# Patient Record
(demographics unavailable — no encounter records)

---

## 2025-02-26 NOTE — HISTORY OF PRESENT ILLNESS
[Parents] : parents [Breast milk] : breast milk [Formula ___ oz/feed] : [unfilled] oz of formula per feed [Hours between feeds ___] : Child is fed every [unfilled] hours [Vitamins ___] : Patient takes [unfilled] vitamins daily [Normal] : Normal [___ voids per day] : [unfilled] voids per day [Frequency of stools: ___] : Frequency of stools: [unfilled]  stools [Yellow] : yellow [Pasty] : pasty [In Bassinet/Crib] : sleeps in bassinet/crib [On back] : sleeps on back [Pacifier use] : Pacifier use [No] : No cigarette smoke exposure [Water heater temperature set at <120 degrees F] : Water heater temperature set at <120 degrees F [Rear facing car seat in back seat] : Rear facing car seat in back seat [Carbon Monoxide Detectors] : Carbon monoxide detectors at home [Smoke Detectors] : Smoke detectors at home. [NO] : No [Co-sleeping] : no co-sleeping [Loose bedding, pillow, toys, and/or bumpers in crib] : no loose bedding, pillow, toys, and/or bumpers in crib [Exposure to electronic nicotine delivery system] : No exposure to electronic nicotine delivery system [At risk for exposure to TB] : Not at risk for exposure to Tuberculosis

## 2025-02-26 NOTE — DISCUSSION/SUMMARY
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 Has follow ups with  service. he is being fed with expressed breast milk and sharan sure formula.  he is being followed in endocrine as this syndrome may be associated with hypoglycemia and poor growth. 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 he has been referred to ENT for this  In addition , he has a right undescended testicle. he has been referred to Urology for this and parents want a circumcision.

## 2025-02-26 NOTE — DEVELOPMENTAL MILESTONES
[Calms when picked up or spoken to] : calms when picked up or spoken to [Looks briefly at objects] : looks briefly at objects

## 2025-02-26 NOTE — PHYSICAL EXAM
[Alert] : alert [Normocephalic] : normocephalic [Flat Open Anterior [place]] : flat open anterior fontanelle [PERRL] : PERRL [Red Reflex Bilateral] : red reflex bilateral [Normally Placed Ears] : normally placed ears [Auricles Well Formed] : auricles well formed [Clear Tympanic membranes] : clear tympanic membranes [Light reflex present] : light reflex present [Bony landmarks visible] : bony landmarks visible [Nares Patent] : nares patent [Palate Intact] : palate intact [Uvula Midline] : uvula midline [Supple, full passive range of motion] : supple, full passive range of motion [Symmetric Chest Rise] : symmetric chest rise [Clear to Auscultation Bilaterally] : clear to auscultation bilaterally [Regular Rate and Rhythm] : regular rate and rhythm [S1, S2 present] : S1, S2 present [+2 Femoral Pulses] : +2 femoral pulses [Soft] : soft [Bowel Sounds] : bowel sounds present [Central Urethral Opening] : central urethral opening [Normally Placed] : normally placed [No Abnormal Lymph Nodes Palpated] : no abnormal lymph nodes palpated [Symmetric Flexed Extremities] : symmetric flexed extremities [Startle Reflex] : startle reflex present [Suck Reflex] : suck reflex present [Rooting] : rooting reflex present [Palmar Grasp] : palmar grasp reflex present [Plantar Grasp] : plantar grasp reflex present [Symmetric [place]] : symmetric [place] [Dermal Melanocytosis] : Dermal Melanocytosis [Acute Distress] : no acute distress [Discharge] : no discharge [Palpable Masses] : no palpable masses [Murmurs] : no murmurs [Tender] : nontender [Distended] : not distended [Hepatomegaly] : no hepatomegaly [Splenomegaly] : no splenomegaly [Normal external genitailia] : no normal external genitalia [Circumcised] : not circumcised [Testicles Descended Bilaterally] : testicle(s) undescended [Castellon-Ortolani] : negative Castellon-Ortolani [Spinal Dimple] : no spinal dimple [Tuft of Hair] : no tuft of hair [Jaundice] : no jaundice [Rash and/or lesion present] : no rash/lesion [de-identified] : tongue tie [FreeTextEntry6] : right undescended testilcle

## 2025-03-17 NOTE — CONSULT LETTER
[Dear  ___] : Dear  [unfilled], [Consult Letter:] : I had the pleasure of evaluating your patient, [unfilled]. [Please see my note below.] : Please see my note below. [Consult Closing:] : Thank you very much for allowing me to participate in the care of this patient.  If you have any questions, please do not hesitate to contact me. [Sincerely,] : Sincerely, [FreeTextEntry3] : Deena Kenney MD [facility] Physician [facility], later [facility] Division of Pediatric Endocrinology

## 2025-03-17 NOTE — PHYSICAL EXAM
[Healthy Appearing] : healthy appearing [Well Nourished] : well nourished [Interactive] : interactive [Normal Appearance] : normal appearance [Well formed] : well formed [Normally Set] : normally set [Normal S1 and S2] : normal S1 and S2 [Murmur] : no murmurs [Clear to Ausculation Bilaterally] : clear to auscultation bilaterally [Abdomen Soft] : soft [Abdomen Tenderness] : non-tender [] : no hepatosplenomegaly [Normal] : normal  [de-identified] : L testicle palpable in scrotal sac, R testicle palpable  in upper inguinal canal

## 2025-03-17 NOTE — PAST MEDICAL HISTORY
[At ___ Weeks Gestation] : at [unfilled] weeks gestation [ Section] : by  section [Non-reassuring Fetal Status] : non-reassuring fetal status [FreeTextEntry1] : 1170g [FreeTextEntry4] : pre-eclampsia

## 2025-03-17 NOTE — HISTORY OF PRESENT ILLNESS
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  Since NICU discharge, he has been well. He feeds pumped breast milk and formula together. He takes 45-50 ml at a time every 2-3 hours. He gets cranky when due for a feeding, he does not get shaky or sweaty. He makes multiple wet diapers per day. He goes 4 days without stooling. When he does stool it they are usually normal. Last week he did not stool for 5 days and stools were hard and pebble like.    will see ENT for tongue tie, urology for undescended testicle, NICU clinic, genetics for RSS. He also needs an appointment with ophthalmology..

## 2025-03-17 NOTE — REASON FOR VISIT
[Consultation] : a consultation visit [Patient] : patient [Parents] : parents [Medical Records] : medical records [Language Line ] : provided by Language Line   [Time Spent: ____ minutes] : Total time spent using  services: [unfilled] minutes. The patient's primary language is not English thus required  services. [Interpreters_IDNumber] : [id number] [Interpreters_FullName] : Gabe [TWNoteComboBox1] : Pakistani

## 2025-03-17 NOTE — REASON FOR VISIT
[Consultation] : a consultation visit [Patient] : patient [Parents] : parents [Medical Records] : medical records [Language Line ] : provided by Language Line   [Time Spent: ____ minutes] : Total time spent using  services: [unfilled] minutes. The patient's primary language is not English thus required  services. [Interpreters_IDNumber] : [id number] [Interpreters_FullName] : Gabe [TWNoteComboBox1] : Albanian

## 2025-03-17 NOTE — CONSULT LETTER
[Dear  ___] : Dear  [unfilled], [Consult Letter:] : I had the pleasure of evaluating your patient, [unfilled]. [Please see my note below.] : Please see my note below. [Consult Closing:] : Thank you very much for allowing me to participate in the care of this patient.  If you have any questions, please do not hesitate to contact me. [Sincerely,] : Sincerely, [FreeTextEntry3] : Deena Kenney MD [facility] Physician [facility] Division of Pediatric Endocrinology

## 2025-03-17 NOTE — PHYSICAL EXAM
[Healthy Appearing] : healthy appearing [Well Nourished] : well nourished [Interactive] : interactive [Normal Appearance] : normal appearance [Well formed] : well formed [Normally Set] : normally set [Normal S1 and S2] : normal S1 and S2 [Murmur] : no murmurs [Clear to Ausculation Bilaterally] : clear to auscultation bilaterally [Abdomen Soft] : soft [Abdomen Tenderness] : non-tender [] : no hepatosplenomegaly [Normal] : normal  [de-identified] : L testicle palpable in scrotal sac, R testicle palpable  in upper inguinal canal

## 2025-03-17 NOTE — END OF VISIT
Lotrisone cream apply twice daily to the affected area   [Time Spent: ___ minutes] : I have spent [unfilled] minutes of time on the encounter which excludes teaching and separately reported services.

## 2025-03-19 NOTE — PHYSICAL EXAM
[Alert] : alert [Normocephalic] : normocephalic [Flat Open Anterior [place]] : flat open anterior fontanelle [PERRL] : PERRL [Red Reflex Bilateral] : red reflex bilateral [Normally Placed Ears] : normally placed ears [Auricles Well Formed] : auricles well formed [Clear Tympanic membranes] : clear tympanic membranes [Light reflex present] : light reflex present [Bony landmarks visible] : bony landmarks visible [Nares Patent] : nares patent [Palate Intact] : palate intact [Uvula Midline] : uvula midline [Supple, full passive range of motion] : supple, full passive range of motion [Symmetric Chest Rise] : symmetric chest rise [Clear to Auscultation Bilaterally] : clear to auscultation bilaterally [Regular Rate and Rhythm] : regular rate and rhythm [S1, S2 present] : S1, S2 present [+2 Femoral Pulses] : +2 femoral pulses [Soft] : soft [Bowel Sounds] : bowel sounds present [Normal external genitailia] : normal external genitalia [Central Urethral Opening] : central urethral opening [Normally Placed] : normally placed [No Abnormal Lymph Nodes Palpated] : no abnormal lymph nodes palpated [Symmetric Flexed Extremities] : symmetric flexed extremities [Startle Reflex] : startle reflex present [Suck Reflex] : suck reflex present [Rooting] : rooting reflex present [Palmar Grasp] : palmar grasp reflex present [Plantar Grasp] : plantar grasp reflex present [Symmetric Shaka] : symmetric [place] [Acute Distress] : no acute distress [Discharge] : no discharge [Erythematous Oropharynx] : no erythematous oropharynx [Palpable Masses] : no palpable masses [Murmurs] : no murmurs [Tender] : nontender [Distended] : not distended [Hepatomegaly] : no hepatomegaly [Splenomegaly] : no splenomegaly [Circumcised] : not circumcised [Testicles Descended Bilaterally] : testicle(s) undescended [Castellon-Ortolani] : negative Castellon-Ortolani [Spinal Dimple] : no spinal dimple [Tuft of Hair] : no tuft of hair [Rash and/or lesion present] : no rash/lesion [de-identified] : tongue -tie [FreeTextEntry6] : right undescended testicle

## 2025-03-19 NOTE — HISTORY OF PRESENT ILLNESS
[Parents] : parents [Formula ___ oz/feed] : [unfilled] oz of formula per feed [Hours between feeds ___] : Child is fed every [unfilled] hours [Vitamins ___] : Patient takes [unfilled] vitamins daily [Normal] : Normal [___ voids per day] : [unfilled] voids per day [Frequency of stools: ___] : Frequency of stools: [unfilled]  stools [every ___ day(s)] : every [unfilled] day(s). [In Bassinet/Crib] : sleeps in bassinet/crib [On back] : sleeps on back [Pacifier use] : Pacifier use [No] : No cigarette smoke exposure [Water heater temperature set at <120 degrees F] : Water heater temperature set at <120 degrees F [Rear facing car seat in back seat] : Rear facing car seat in back seat [Carbon Monoxide Detectors] : Carbon monoxide detectors at home [Smoke Detectors] : Smoke detectors at home. [NO] : No [Co-sleeping] : no co-sleeping [Loose bedding, pillow, toys, and/or bumpers in crib] : no loose bedding, pillow, toys, and/or bumpers in crib [Exposure to electronic nicotine delivery system] : No exposure to electronic nicotine delivery system [At risk for exposure to TB] : Not at risk for exposure to Tuberculosis

## 2025-03-19 NOTE — DEVELOPMENTAL MILESTONES
[None] : none [Smiles responsively] : smiles responsively [Vocalizes with simple cooing] : vocalizes with simple cooing [Opens and shuts hands] : opens and shuts hands [Passed] : passed [Lifts head and chest in prone] : does not lift head and chest in prone

## 2025-03-19 NOTE — DISCUSSION/SUMMARY
[No Elimination Concerns] : elimination [Continue Regimen] : feeding [No Skin Concerns] : skin [Normal Sleep Pattern] : sleep [ Infant] :  infant [Poor Weight Gain] : poor weight gain [Poor Length Gain] : poor length gain [None] : no medical problems [Anticipatory Guidance Given] : Anticipatory guidance addressed as per the history of present illness section [Age Approp Vaccines] : Age appropriate vaccines administered [Parent/Guardian] : Parent/Guardian [] : The components of the vaccine(s) to be administered today are listed in the plan of care. The disease(s) for which the vaccine(s) are intended to prevent and the risks have been discussed with the caretaker.  The risks are also included in the appropriate vaccination information statements which have been provided to the patient's caregiver.  The caregiver has given consent to vaccinate. [Delayed-Normal For Gest Age] : delayed but normal for patient's gestational age [No Medication Changes] : No medication changes at this time [de-identified] : Urology/ GI [FreeTextEntry1] : Recommend exclusive breastfeeding, 8-12 feedings per day. Mother should continue prenatal vitamins and avoid alcohol. If formula is needed, recommend iron-fortified formulations, 2-4 oz every 3-4 hrs. When in car, patient should be in rear-facing car seat in back seat. Put baby to sleep on back, in own crib with no loose or soft bedding. Help baby to maintain sleep and feeding routines. May offer pacifier if needed. Continue tummy time when awake. Parents counseled to call if rectal temperature >100.4 degrees F.

## 2025-03-21 NOTE — BIRTH HISTORY
[At ___ Weeks Gestation] : at [unfilled] weeks gestation [ Section] : by  section [Passed] : passed [de-identified] : 33 [de-identified] : NICUx 6 weeks, intubated & NGT

## 2025-03-21 NOTE — HISTORY OF PRESENT ILLNESS
[de-identified] : 3-21-25 2mo M here for initial evaluation of tongue tie sent by PMD for evaluation  takes EBM and/or formula 50ml q3hr  struggles to latch to bottle nipple, occasionally spills out of the sides mom unsure if the tongue tie is impacting his ability to eat gaining weight per mom  +snoring no pausing, choking, or gasping  some noisy breathing passed [facility] no family history of anesthesia complications or bleeding disorder  not followed by any other specialists  Nasim Silver syndrome some noisy breathing. no apneas or pauses

## 2025-03-21 NOTE — ASSESSMENT
[FreeTextEntry1] : 2 month male with tongue tie and noisy breathing.  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  Tongue tie noted. Discussed options of observation vs tongue tie release. Parents elected for tongue tie release and patient did well afterwards.   Discussed that tongue tie rarely causes any speech issues and if they do have speech issues it is pronunciation/articulation and not the number of words they develop and this can be revisited later in life if it becomes and issue.    Discussed that feeding difficulties can be multifactorial and if continues to have issues would recommend evaluation by feeding therapy.    Discussed at length with parent in regards to the natural history of laryngomalacia.  Discussed that the noise often worsens initially and peaks around 3-4 months of age and then usually subsides.  Depending on severity we sometimes need to intervene. In severe cases these patients often do not leave the hospital due to severity or at home they have issues with feeding, sleeping, and failure to thrive.  In theses cases we usually discuss surgical intervention. In mild cases, we often observe and no intervention or medications required.  In moderate cases, some of these patients progress on to needing intervention and some resolve.  Occasionally we use anti-reflux medicine in the moderate and severe cases, but I tend to try to avoid medications at this age and most kids grow out of the reflux as well.   I spent a total of 45 minutes on the encounter excluding separately billable services including but not limited to direct patient care, review and independent interpretation of prior records and testing, documentation, counseling patient/family, and coordination of care.

## 2025-03-21 NOTE — PHYSICAL EXAM
[Normal Gait and Station] : normal gait and station [Normal muscle strength, symmetry and tone of facial, head and neck musculature] : normal muscle strength, symmetry and tone of facial, head and neck musculature [Normal] : no cervical lymphadenopathy [Exposed Vessel] : left anterior vessel not exposed [Wheezing] : no wheezing [Increased Work of Breathing] : no increased work of breathing with use of accessory muscles and retractions [de-identified] : syndromic facies.  [de-identified] : tongue tie

## 2025-03-26 NOTE — DISCUSSION/SUMMARY
[GA at Birth: ___] : GA at Birth: [unfilled] [Chronological Age: ___] : Chronological Age: [unfilled] [Corrected Age: ___] : Corrected Age: [unfilled] [Alert] : alert [] : axial tone normal [Turns head to both sides (0-2 months)] : turns head to both sides (0-2 months) [Moves extremities equally] : moves extremities equally [Moves against gravity] : moves against gravity [Turns head side to side] : turns head side to side [Lifts head (45 deg 0-2 mon, 90 deg 1-3 mon)] : lifts head (45 degrees 0-2 months, 90 degrees 1-3 months) [Active] : sidelying to supine (1.5 - 2 months) - Active [Passive] : prone to supine (2- 5 months) - Passive [Lag] : Head lag (0-2 months) - lag [Poor] : head control is poor [Gross Grasp] : gross grasp [Release] : release [>] : > [Focusing (2 months)] : focusing (2 months) [Tracking (2 months)] : tracking (2 months) [Supine] : supine [Prone] : prone [Sidelying] : sidelying [FreeTextEntry1] : poonam silver syndrome, Trisomy 7 [FreeTextEntry5] : right plagiocephaly [FreeTextEntry6] : mildly decreased muscle tone in bilateral LE & bilateral UE [FreeTextEntry3] : Infant tolerated PT session well. Parents educated in developmental positioning packet level I in [place]anian. Parents educated in strategies to improve head shaping and cervical ROM activities demonstrated. Parents with good understanding of all education.  Keeley #[id number] present for entire session.

## 2025-03-26 NOTE — DISCUSSION/SUMMARY
[GA at Birth: ___] : GA at Birth: [unfilled] [Chronological Age: ___] : Chronological Age: [unfilled] [Corrected Age: ___] : Corrected Age: [unfilled] [Alert] : alert [] : axial tone normal [Turns head to both sides (0-2 months)] : turns head to both sides (0-2 months) [Moves extremities equally] : moves extremities equally [Moves against gravity] : moves against gravity [Turns head side to side] : turns head side to side [Lifts head (45 deg 0-2 mon, 90 deg 1-3 mon)] : lifts head (45 degrees 0-2 months, 90 degrees 1-3 months) [Active] : sidelying to supine (1.5 - 2 months) - Active [Passive] : prone to supine (2- 5 months) - Passive [Lag] : Head lag (0-2 months) - lag [Poor] : head control is poor [Gross Grasp] : gross grasp [Release] : release [>] : > [Focusing (2 months)] : focusing (2 months) [Tracking (2 months)] : tracking (2 months) [Supine] : supine [Prone] : prone [Sidelying] : sidelying [FreeTextEntry1] : poonam silver syndrome, Trisomy 7 [FreeTextEntry5] : right plagiocephaly [FreeTextEntry6] : mildly decreased muscle tone in bilateral LE & bilateral UE [FreeTextEntry3] : Infant tolerated PT session well. Parents educated in developmental positioning packet level I in [place]uran. Parents educated in strategies to improve head shaping and cervical ROM activities demonstrated. Parents with good understanding of all education.  Keeley #[id number] present for entire session.

## 2025-03-26 NOTE — DISCUSSION/SUMMARY
[GA at Birth: ___] : GA at Birth: [unfilled] [Chronological Age: ___] : Chronological Age: [unfilled] [Corrected Age: ___] : Corrected Age: [unfilled] [Alert] : alert [] : axial tone normal [Turns head to both sides (0-2 months)] : turns head to both sides (0-2 months) [Moves extremities equally] : moves extremities equally [Moves against gravity] : moves against gravity [Turns head side to side] : turns head side to side [Lifts head (45 deg 0-2 mon, 90 deg 1-3 mon)] : lifts head (45 degrees 0-2 months, 90 degrees 1-3 months) [Active] : sidelying to supine (1.5 - 2 months) - Active [Passive] : prone to supine (2- 5 months) - Passive [Lag] : Head lag (0-2 months) - lag [Poor] : head control is poor [Gross Grasp] : gross grasp [Release] : release [>] : > [Focusing (2 months)] : focusing (2 months) [Tracking (2 months)] : tracking (2 months) [Supine] : supine [Prone] : prone [Sidelying] : sidelying [FreeTextEntry1] : poonam silver syndrome, Trisomy 7 [FreeTextEntry5] : right plagiocephaly [FreeTextEntry6] : mildly decreased muscle tone in bilateral LE & bilateral UE [FreeTextEntry3] : Infant tolerated PT session well. Parents educated in developmental positioning packet level I in Montenegrin. Parents educated in strategies to improve head shaping and cervical ROM activities demonstrated. Parents with good understanding of all education.  Keeley #[id number] present for entire session.

## 2025-03-26 NOTE — DISCUSSION/SUMMARY
[GA at Birth: ___] : GA at Birth: [unfilled] [Chronological Age: ___] : Chronological Age: [unfilled] [Corrected Age: ___] : Corrected Age: [unfilled] [Alert] : alert [] : axial tone normal [Turns head to both sides (0-2 months)] : turns head to both sides (0-2 months) [Moves extremities equally] : moves extremities equally [Moves against gravity] : moves against gravity [Turns head side to side] : turns head side to side [Lifts head (45 deg 0-2 mon, 90 deg 1-3 mon)] : lifts head (45 degrees 0-2 months, 90 degrees 1-3 months) [Active] : sidelying to supine (1.5 - 2 months) - Active [Passive] : prone to supine (2- 5 months) - Passive [Lag] : Head lag (0-2 months) - lag [Poor] : head control is poor [Gross Grasp] : gross grasp [Release] : release [>] : > [Focusing (2 months)] : focusing (2 months) [Tracking (2 months)] : tracking (2 months) [Supine] : supine [Prone] : prone [Sidelying] : sidelying [FreeTextEntry1] : poonam silver syndrome, Trisomy 7 [FreeTextEntry5] : right plagiocephaly [FreeTextEntry6] : mildly decreased muscle tone in bilateral LE & bilateral UE [FreeTextEntry3] : Infant tolerated PT session well. Parents educated in developmental positioning packet level I in Indonesian. Parents educated in strategies to improve head shaping and cervical ROM activities demonstrated. Parents with good understanding of all education.  Keeley #[id number] present for entire session.

## 2025-03-27 NOTE — HISTORY OF PRESENT ILLNESS
[EDC: ___] : EDC: [unfilled] [Gestational Age: ___] : Gestational Age: [unfilled] [Chronological Age: ___] : Chronological Age: [unfilled] [Corrected Age: ___] : Corrected Age: [unfilled] [Developmental Pediatrics: ___] : Developmental Pediatrics: [unfilled] [Pediatric Surgery: ___] : Pediatric Surgery: [unfilled] [Ophthalmology: ___] : Ophthalmology: [unfilled] [Car seat use according to directions] : car seat used according to directions [Weight Gain Since Last Visit (oz/days) ___] : weight gain since last visit: [unfilled] (oz/days)  [___Formula] : [unfilled] [Every ___ hours] : every [unfilled] hours [_____ Times Per] : Stool frequency occurs [unfilled] times per  [Large amount] : large [Soft] : soft [Solid Foods] : no solid food at this time [Bloody] : not bloody [Mucousy] : no mucous [de-identified] : CYRIL BUSTAMANTE [de-identified] :  High risk  & Developmental follow up NRE 8 EI referral made on DC [de-identified] : none [de-identified] : ENT - no further f/u   URO  4/15/25   GENETICS  4/3    ENDO   9/25/25 [de-identified] : done  [de-identified] : spits up with feed. Gassy.  Poor feeding since the frenulectomy (3/21) [de-identified] : was feeding 45-40 ml per feed but now only taking 20-40 ml each feeding [FreeTextEntry4] : every 2-4 days [de-identified] : Placed supine in own bed.  Wakes or parents wake the baby to feed every 3 hrs [de-identified] : n/a

## 2025-03-27 NOTE — PHYSICAL EXAM
[Pink] : pink [Well Perfused] : well perfused [No Rashes] : no rashes [No Birth Marks] : no birth marks [Conjunctiva Clear] : conjunctiva clear [PERRL] : pupils were equal, round, reactive to light  [Ears Normal Position and Shape] : normal position and shape of ears [Nares Patent] : nares patent [No Nasal Flaring] : no nasal flaring [Moist and Pink Mucous Membranes] : moist and pink mucous membranes [Palate Intact] : palate intact [No Torticollis] : no torticollis [No Neck Masses] : no neck masses [Symmetric Expansion] : symmetric chest expansion [No Retractions] : no retractions [Clear to Auscultation] : lungs clear to auscultation  [Normal S1, S2] : normal S1 and S2 [Regular Rhythm] : regular rhythm [No Murmur] : no mumur [Normal Pulses] : normal pulses [Non Distended] : non distended [No HSM] : no hepatosplenomegaly appreciated [No Masses] : no masses were palpated [Normal Bowel Sounds] : normal bowel sounds [No Umbilical Hernia] : no umbilical hernia [No Sacral Dimples] : no sacral dimples [No Scoliosis] : no scoliosis [Normal Range of Motion] : normal range of motion [Normal Posture] : normal posture [No evidence of Hip Dislocation] : no evidence of hip dislocation [Active and Alert] : active and alert [Normal truncal tone] : normal truncal tone [Normal deep tendon reflexes] : normal deep tendon reflexes [No head lag] : no head lag [Symmetric Shaka] : the [place] reflex was ~L present [Fixes On Faces] : fixes on faces [Follows to Midline] : the gaze follows to the midline [Turns Head Side to Side in Prone] : turns head side to side in prone [Hands Open] : the hands open [de-identified] : (R) IGH, reduible and  (R) undescended testes [de-identified] : hypotonia x 4

## 2025-03-27 NOTE — ASSESSMENT
[FreeTextEntry1] : JUNIOR SANTIAGO is a 33 1/7 week gestation infant, now chronologic age 2mos 3 wks, corrected age 1 mo seen in  follow-up. Pertinent NICU history includes Prematurity 33 wks, Ansim Silver Syndrome, Respiratory failure due to RDS, Thrombocytopenia, Hyperbilirubinemia, Poor Growth.  The following issues were addressed at this visit.  Growth and nutrition: Weight gain has been 19 g/day and plots at the 0 percentile for corrected age.  Head growth and length are at the 0 and 0 percentiles respectively.  Baby is currently feeding 27 kobe Neosure and had been taking 45-50 ml per feeding every 3 hrs .  Since his frenectomy on 3/21, the baby's intake has decreased to 20-40 ml each feeding.  The volume per feeding has improved somewhat since 3/24 but still not adequate.  Parents instructed to take the baby to his PMD on 3/28 for weight check if he still is unable to take 45-50 ml consistently.   will see the baby in NICU Grad Clinic in 2 mos. to follow up feeding and weight gain. Due to prematurity, solid foods are not recommended until 5-6 months corrected age with good head control. No labs needed today. Continue vitamin supplements.  Development/neuro: baby has developmental delay for chronologic age, was seen by PT/OT today and given home exercises to do. 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 Early Intervention is recommended but parents have not been contacted.   SW will contact EI to facilitate scheduling the assessment.  Baby will follow-up with pediatric developmental - needs an August appointment.   Anemia: Baby has been on iron supplements but is now taking full formula feeds.  Discharge Hct 30.4.  Parents instructed to finish the current bottle of iron then discontinue the supplement since Hct is appropriate for age.  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  Baby will be seen by Urology on 4/15.  CLD: Infant has no sx of chronic lung disease. O2 sats 100% in RA. The baby received Beyfortus on 25.  PAULA: Baby has mild signs of PAULA. Reviewed non-pharmacologic methods to reduce symptoms including pacing, frequent burping and keeping the baby upright after feeds.  The baby gets Simethicone drops prn for gas.  ROP: Baby is at risk for ROP and other ophthalmologic complications due to prematurity follows with ophthalmology.  Next appointment is due in August. Parents informed of importance of ophtho follow-up.   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  Will follow with Genetics on 4/3/21.  No hypoglycemia noted and will f/u with Endocrine on 25.   (R) Inguinal hernia observed and easily reducible.  Reviewed signs of incarceration with parent. Referral made to Peds Surgery and parents will call to schedule an appointment for assessment.  Other:   Health maintenance: Reviewed routine vaccination schedule with parent as well as guidance for flu vaccine for family, COVID-19 precautions, and need for PMD f/u.  Also discussed bathing and skin care recommendations.   Reviewed notes by (other services)   Next neonatology f/u:  @ 0945.

## 2025-03-27 NOTE — HISTORY OF PRESENT ILLNESS
[EDC: ___] : EDC: [unfilled] [Gestational Age: ___] : Gestational Age: [unfilled] [Chronological Age: ___] : Chronological Age: [unfilled] [Corrected Age: ___] : Corrected Age: [unfilled] [Developmental Pediatrics: ___] : Developmental Pediatrics: [unfilled] [Pediatric Surgery: ___] : Pediatric Surgery: [unfilled] [Ophthalmology: ___] : Ophthalmology: [unfilled] [Car seat use according to directions] : car seat used according to directions [Weight Gain Since Last Visit (oz/days) ___] : weight gain since last visit: [unfilled] (oz/days)  [___Formula] : [unfilled] [Every ___ hours] : every [unfilled] hours [_____ Times Per] : Stool frequency occurs [unfilled] times per  [Large amount] : large [Soft] : soft [Solid Foods] : no solid food at this time [Bloody] : not bloody [Mucousy] : no mucous [de-identified] : CYRIL BUSTAMANTE [de-identified] :  High risk  & Developmental follow up NRE 8 EI referral made on DC [de-identified] : none [de-identified] : ENT - no further f/u   URO  4/15/25   GENETICS  4/3    ENDO   9/25/25 [de-identified] : done  [de-identified] : spits up with feed. Gassy.  Poor feeding since the frenulectomy (3/21) [de-identified] : was feeding 45-40 ml per feed but now only taking 20-40 ml each feeding [FreeTextEntry4] : every 2-4 days [de-identified] : Placed supine in own bed.  Wakes or parents wake the baby to feed every 3 hrs [de-identified] : n/a

## 2025-03-27 NOTE — PATIENT INSTRUCTIONS
[Verbal patient instructions provided] : Verbal patient instructions provided. [FreeTextEntry1] : Needs Developmental Pediatrics appt in August ( at 6 months Corrected Age) phone [phone] Neonatology will email clinic to facilitate scheduling the appointment Referral made to Peds surgery for IGH call for an appointment    [phone] F/U w/ Eye Dr in August 2025 F/U Endocrine 9.25.25 F/U Urology 4/15/25 F/U genetics 4/3/25 Next [facility] appt on 5/28/25 @ 9:45 [FreeTextEntry2] : Evaluated by PT. [FreeTextEntry3] : SW to contact EI [FreeTextEntry4] : Continue 27 kobe feeds.  If feeding hasn't improved by Thursday night, see your pediatrician on Friday [FreeTextEntry5] : Continue vitamins.  Stop iron when this bottle is finished [FreeTextEntry6] : n/a [FreeTextEntry8] : PMD to do [de-identified] :  Received RSV vaccine in the NICU [de-identified] : none [de-identified] : Aquaphor for skin during winter months  / Aquaphor for skin , avoid  direct sun exposure during summer months [de-identified] : none

## 2025-03-27 NOTE — PATIENT INSTRUCTIONS
[Verbal patient instructions provided] : Verbal patient instructions provided. [FreeTextEntry1] : Needs Developmental Pediatrics appt in August ( at 6 months Corrected Age) phone [phone] Neonatology will email clinic to facilitate scheduling the appointment Referral made to Peds surgery for IGH call for an appointment    [phone] F/U w/ Eye Dr in August 2025 F/U Endocrine 9.25.25 F/U Urology 4/15/25 F/U genetics 4/3/25 Next [facility] appt on 5/28/25 @ 9:45 [FreeTextEntry2] : Evaluated by PT. [FreeTextEntry3] : SW to contact EI [FreeTextEntry4] : Continue 27 kobe feeds.  If feeding hasn't improved by Thursday night, see your pediatrician on Friday [FreeTextEntry5] : Continue vitamins.  Stop iron when this bottle is finished [FreeTextEntry6] : n/a [FreeTextEntry8] : PMD to do [de-identified] :  Received RSV vaccine in the NICU [de-identified] : Aquaphor for skin during winter months  / Aquaphor for skin , avoid  direct sun exposure during summer months [de-identified] : none [de-identified] : none

## 2025-03-27 NOTE — REVIEW OF SYSTEMS
[Immunizations are up to date] : Immunizations are up to date [RSV prophylaxis received] : RSV prophylaxis received [Swelling in Scrotum] : swelling in scrotum [Nl] : Allergy/Immunology [Rhinorrhea] : rhinorrhea [Dec Urine Output] : no oliguria [FreeTextEntry2] : poor feeding since 3/21 frenuelectomy [FreeTextEntry1] : (R) IGH  Undescended (R) testes [de-identified] : (R) plagiocephaly

## 2025-03-27 NOTE — HISTORY OF PRESENT ILLNESS
[EDC: ___] : EDC: [unfilled] [Gestational Age: ___] : Gestational Age: [unfilled] [Chronological Age: ___] : Chronological Age: [unfilled] [Corrected Age: ___] : Corrected Age: [unfilled] [Developmental Pediatrics: ___] : Developmental Pediatrics: [unfilled] [Pediatric Surgery: ___] : Pediatric Surgery: [unfilled] [Ophthalmology: ___] : Ophthalmology: [unfilled] [Car seat use according to directions] : car seat used according to directions [Weight Gain Since Last Visit (oz/days) ___] : weight gain since last visit: [unfilled] (oz/days)  [___Formula] : [unfilled] [Every ___ hours] : every [unfilled] hours [_____ Times Per] : Stool frequency occurs [unfilled] times per  [Large amount] : large [Soft] : soft [Solid Foods] : no solid food at this time [Bloody] : not bloody [Mucousy] : no mucous [de-identified] : CYRIL BUSTAMANTE [de-identified] :  High risk  & Developmental follow up NRE 8 EI referral made on DC [de-identified] : none [de-identified] : ENT - no further f/u   URO  4/15/25   GENETICS  4/3    ENDO   9/25/25 [de-identified] : done  [de-identified] : spits up with feed. Gassy.  Poor feeding since the frenulectomy (3/21) [de-identified] : was feeding 45-40 ml per feed but now only taking 20-40 ml each feeding [de-identified] : Placed supine in own bed.  Wakes or parents wake the baby to feed every 3 hrs [FreeTextEntry4] : every 2-4 days [de-identified] : n/a

## 2025-03-27 NOTE — PHYSICAL EXAM
[Pink] : pink [Well Perfused] : well perfused [No Rashes] : no rashes [No Birth Marks] : no birth marks [Conjunctiva Clear] : conjunctiva clear [PERRL] : pupils were equal, round, reactive to light  [Ears Normal Position and Shape] : normal position and shape of ears [Nares Patent] : nares patent [No Nasal Flaring] : no nasal flaring [Moist and Pink Mucous Membranes] : moist and pink mucous membranes [Palate Intact] : palate intact [No Torticollis] : no torticollis [No Neck Masses] : no neck masses [Symmetric Expansion] : symmetric chest expansion [No Retractions] : no retractions [Clear to Auscultation] : lungs clear to auscultation  [Normal S1, S2] : normal S1 and S2 [Regular Rhythm] : regular rhythm [No Murmur] : no mumur [Normal Pulses] : normal pulses [Non Distended] : non distended [No HSM] : no hepatosplenomegaly appreciated [No Masses] : no masses were palpated [Normal Bowel Sounds] : normal bowel sounds [No Umbilical Hernia] : no umbilical hernia [No Sacral Dimples] : no sacral dimples [No Scoliosis] : no scoliosis [Normal Range of Motion] : normal range of motion [Normal Posture] : normal posture [No evidence of Hip Dislocation] : no evidence of hip dislocation [Active and Alert] : active and alert [Normal truncal tone] : normal truncal tone [Normal deep tendon reflexes] : normal deep tendon reflexes [No head lag] : no head lag [Symmetric Shaka] : the [place] reflex was ~L present [Fixes On Faces] : fixes on faces [Follows to Midline] : the gaze follows to the midline [Turns Head Side to Side in Prone] : turns head side to side in prone [Hands Open] : the hands open [de-identified] : (R) IGH, reduible and  (R) undescended testes [de-identified] : hypotonia x 4

## 2025-03-27 NOTE — REVIEW OF SYSTEMS
[Immunizations are up to date] : Immunizations are up to date [RSV prophylaxis received] : RSV prophylaxis received [Swelling in Scrotum] : swelling in scrotum [Nl] : Allergy/Immunology [Rhinorrhea] : rhinorrhea [Dec Urine Output] : no oliguria [FreeTextEntry2] : poor feeding since 3/21 frenuelectomy [FreeTextEntry1] : (R) IGH  Undescended (R) testes [de-identified] : (R) plagiocephaly

## 2025-03-27 NOTE — BIRTH HISTORY
[Birthweight ___ kg] : weight [unfilled] kg [de-identified] : C/S for a non reassuring fetal heart tracing of a 34 weeks gestation baby boy. Mother is a 44 year old  with prenatal labs pending, blood type O pos. Pregnancy complicated by early onset fetal growth restriction, AEDV, GDMA1, and chromosomal abnormality of trisomy 7 Nasim Silver syndrome with fetal anomalies noted for absent nasal bone, Right pelvic kidney, echogenic bowel and single umbilical artery with a normal fetal echo. Mother presented to labor and delivery from office with new onset oligohydramnios and a non reassuring fetal heart tracing. AROM at delivery with clear fluid, infant emerged cephalic with good tone and weak cry. Infant placed in warmer on heated mattress, stimulated and suctioned, CPAP applied at 2 mins of life for retractions and duskiness, max settings peep 6 40% to achieve sats in target range for age. Infant transferred to NICU on CPAP 6 25% for further evaluation and management. Apgars 8/9. [de-identified] : SGS  Hypoglycemia trisomy 7 Wu silver syndrome    33 weeks

## 2025-03-27 NOTE — REVIEW OF SYSTEMS
[Immunizations are up to date] : Immunizations are up to date [RSV prophylaxis received] : RSV prophylaxis received [Swelling in Scrotum] : swelling in scrotum [Nl] : Allergy/Immunology [Rhinorrhea] : rhinorrhea [Dec Urine Output] : no oliguria [FreeTextEntry2] : poor feeding since 3/21 frenuelectomy [de-identified] : (R) plagiocephaly [FreeTextEntry1] : (R) IGH  Undescended (R) testes

## 2025-03-27 NOTE — BIRTH HISTORY
[Birthweight ___ kg] : weight [unfilled] kg [de-identified] : C/S for a non reassuring fetal heart tracing of a 34 weeks gestation baby boy. Mother is a 44 year old  with prenatal labs pending, blood type O pos. Pregnancy complicated by early onset fetal growth restriction, AEDV, GDMA1, and chromosomal abnormality of trisomy 7 Nasim Silver syndrome with fetal anomalies noted for absent nasal bone, Right pelvic kidney, echogenic bowel and single umbilical artery with a normal fetal echo. Mother presented to labor and delivery from office with new onset oligohydramnios and a non reassuring fetal heart tracing. AROM at delivery with clear fluid, infant emerged cephalic with good tone and weak cry. Infant placed in warmer on heated mattress, stimulated and suctioned, CPAP applied at 2 mins of life for retractions and duskiness, max settings peep 6 40% to achieve sats in target range for age. Infant transferred to NICU on CPAP 6 25% for further evaluation and management. Apgars 8/9. [de-identified] : SGS  Hypoglycemia trisomy 7 Wu silver syndrome    33 weeks

## 2025-03-27 NOTE — ASSESSMENT
[FreeTextEntry1] : JUNIOR SANTIAGO is a 33 1/7 week gestation infant, now chronologic age 2mos 3 wks, corrected age 1 mo seen in  follow-up. Pertinent NICU history includes Prematurity 33 wks, Nasim Silver Syndrome, Respiratory failure due to RDS, Thrombocytopenia, Hyperbilirubinemia, Poor Growth.  The following issues were addressed at this visit.  Growth and nutrition: Weight gain has been 19 g/day and plots at the 0 percentile for corrected age.  Head growth and length are at the 0 and 0 percentiles respectively.  Baby is currently feeding 27 kobe Neosure and had been taking 45-50 ml per feeding every 3 hrs .  Since his frenectomy on 3/21, the baby's intake has decreased to 20-40 ml each feeding.  The volume per feeding has improved somewhat since 3/24 but still not adequate.  Parents instructed to take the baby to his PMD on 3/28 for weight check if he still is unable to take 45-50 ml consistently.   will see the baby in NICU Grad Clinic in 2 mos. to follow up feeding and weight gain. Due to prematurity, solid foods are not recommended until 5-6 months corrected age with good head control. No labs needed today. Continue vitamin supplements.  Development/neuro: baby has developmental delay for chronologic age, was seen by PT/OT today and given home exercises to do. 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 Early Intervention is recommended but parents have not been contacted.   SW will contact EI to facilitate scheduling the assessment.  Baby will follow-up with pediatric developmental - needs an August appointment.   Anemia: Baby has been on iron supplements but is now taking full formula feeds.  Discharge Hct 30.4.  Parents instructed to finish the current bottle of iron then discontinue the supplement since Hct is appropriate for age.  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  Baby will be seen by Urology on 4/15.  CLD: Infant has no sx of chronic lung disease. O2 sats 100% in RA. The baby received Beyfortus on 25.  PAULA: Baby has mild signs of PAULA. Reviewed non-pharmacologic methods to reduce symptoms including pacing, frequent burping and keeping the baby upright after feeds.  The baby gets Simethicone drops prn for gas.  ROP: Baby is at risk for ROP and other ophthalmologic complications due to prematurity follows with ophthalmology.  Next appointment is due in August. Parents informed of importance of ophtho follow-up.   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  Will follow with Genetics on 4/3/21.  No hypoglycemia noted and will f/u with Endocrine on 25.   (R) Inguinal hernia observed and easily reducible.  Reviewed signs of incarceration with parent. Referral made to Peds Surgery and parents will call to schedule an appointment for assessment.  Other:   Health maintenance: Reviewed routine vaccination schedule with parent as well as guidance for flu vaccine for family, COVID-19 precautions, and need for PMD f/u.  Also discussed bathing and skin care recommendations.   Reviewed notes by (other services)   Next neonatology f/u:  @ 0945.

## 2025-03-27 NOTE — BIRTH HISTORY
[Birthweight ___ kg] : weight [unfilled] kg [de-identified] : C/S for a non reassuring fetal heart tracing of a 34 weeks gestation baby boy. Mother is a 44 year old  with prenatal labs pending, blood type O pos. Pregnancy complicated by early onset fetal growth restriction, AEDV, GDMA1, and chromosomal abnormality of trisomy 7 Nasim Silver syndrome with fetal anomalies noted for absent nasal bone, Right pelvic kidney, echogenic bowel and single umbilical artery with a normal fetal echo. Mother presented to labor and delivery from office with new onset oligohydramnios and a non reassuring fetal heart tracing. AROM at delivery with clear fluid, infant emerged cephalic with good tone and weak cry. Infant placed in warmer on heated mattress, stimulated and suctioned, CPAP applied at 2 mins of life for retractions and duskiness, max settings peep 6 40% to achieve sats in target range for age. Infant transferred to NICU on CPAP 6 25% for further evaluation and management. Apgars 8/9. [de-identified] : SGS  Hypoglycemia trisomy 7 Wu silver syndrome    33 weeks

## 2025-03-27 NOTE — DATA REVIEWED
[de-identified] : 2/3 HUS - Grade 2 resolving [de-identified] : CCHD & Hearing passed negative - no vomiting, no diarrhea

## 2025-03-27 NOTE — PATIENT INSTRUCTIONS
[Verbal patient instructions provided] : Verbal patient instructions provided. [FreeTextEntry1] : Needs Developmental Pediatrics appt in August ( at 6 months Corrected Age) phone [phone] Neonatology will email clinic to facilitate scheduling the appointment Referral made to Peds surgery for IGH call for an appointment    [phone] F/U w/ Eye Dr in August 2025 F/U Endocrine 9.25.25 F/U Urology 4/15/25 F/U genetics 4/3/25 Next [facility] appt on 5/28/25 @ 9:45 [FreeTextEntry2] : Evaluated by PT. [FreeTextEntry3] : SW to contact EI [FreeTextEntry4] : Continue 27 kobe feeds.  If feeding hasn't improved by Thursday night, see your pediatrician on Friday [FreeTextEntry5] : Continue vitamins.  Stop iron when this bottle is finished [FreeTextEntry6] : n/a [FreeTextEntry8] : PMD to do [de-identified] :  Received RSV vaccine in the NICU [de-identified] : Aquaphor for skin during winter months  / Aquaphor for skin , avoid  direct sun exposure during summer months [de-identified] : none [de-identified] : none

## 2025-03-27 NOTE — CONSULT LETTER
[Dear  ___] : Dear  [unfilled], [Courtesy Letter:] : I had the pleasure of seeing your patient, [unfilled], in my office today. [Sincerely,] : Sincerely, [FreeTextEntry3] : Heidy Bridges MD Attending Neonatologist [facility]

## 2025-03-27 NOTE — PATIENT INSTRUCTIONS
[Verbal patient instructions provided] : Verbal patient instructions provided. [FreeTextEntry1] : Needs Developmental Pediatrics appt in August ( at 6 months Corrected Age) phone [phone] Neonatology will email clinic to facilitate scheduling the appointment Referral made to Peds surgery for IGH call for an appointment    [phone] F/U w/ Eye Dr in August 2025 F/U Endocrine 9.25.25 F/U Urology 4/15/25 F/U genetics 4/3/25 Next [facility] appt on 5/28/25 @ 9:45 [FreeTextEntry2] : Evaluated by PT. [FreeTextEntry3] : SW to contact EI [FreeTextEntry4] : Continue 27 kobe feeds.  If feeding hasn't improved by Thursday night, see your pediatrician on Friday [FreeTextEntry5] : Continue vitamins.  Stop iron when this bottle is finished [FreeTextEntry6] : n/a [FreeTextEntry8] : PMD to do [de-identified] :  Received RSV vaccine in the NICU [de-identified] : Aquaphor for skin during winter months  / Aquaphor for skin , avoid  direct sun exposure during summer months [de-identified] : none [de-identified] : none

## 2025-03-27 NOTE — HISTORY OF PRESENT ILLNESS
[EDC: ___] : EDC: [unfilled] [Gestational Age: ___] : Gestational Age: [unfilled] [Chronological Age: ___] : Chronological Age: [unfilled] [Corrected Age: ___] : Corrected Age: [unfilled] [Developmental Pediatrics: ___] : Developmental Pediatrics: [unfilled] [Pediatric Surgery: ___] : Pediatric Surgery: [unfilled] [Ophthalmology: ___] : Ophthalmology: [unfilled] [Car seat use according to directions] : car seat used according to directions [Weight Gain Since Last Visit (oz/days) ___] : weight gain since last visit: [unfilled] (oz/days)  [___Formula] : [unfilled] [Every ___ hours] : every [unfilled] hours [_____ Times Per] : Stool frequency occurs [unfilled] times per  [Large amount] : large [Soft] : soft [Solid Foods] : no solid food at this time [Bloody] : not bloody [Mucousy] : no mucous [de-identified] : CYRIL BUSTAMANTE [de-identified] :  High risk  & Developmental follow up NRE 8 EI referral made on DC [de-identified] : none [de-identified] : ENT - no further f/u   URO  4/15/25   GENETICS  4/3    ENDO   9/25/25 [de-identified] : done  [de-identified] : spits up with feed. Gassy.  Poor feeding since the frenulectomy (3/21) [de-identified] : was feeding 45-40 ml per feed but now only taking 20-40 ml each feeding [de-identified] : Placed supine in own bed.  Wakes or parents wake the baby to feed every 3 hrs [FreeTextEntry4] : every 2-4 days [de-identified] : n/a

## 2025-03-27 NOTE — PHYSICAL EXAM
[Pink] : pink [Well Perfused] : well perfused [No Rashes] : no rashes [No Birth Marks] : no birth marks [Conjunctiva Clear] : conjunctiva clear [PERRL] : pupils were equal, round, reactive to light  [Ears Normal Position and Shape] : normal position and shape of ears [Nares Patent] : nares patent [No Nasal Flaring] : no nasal flaring [Moist and Pink Mucous Membranes] : moist and pink mucous membranes [Palate Intact] : palate intact [No Torticollis] : no torticollis [No Neck Masses] : no neck masses [Symmetric Expansion] : symmetric chest expansion [No Retractions] : no retractions [Clear to Auscultation] : lungs clear to auscultation  [Normal S1, S2] : normal S1 and S2 [Regular Rhythm] : regular rhythm [No Murmur] : no mumur [Normal Pulses] : normal pulses [Non Distended] : non distended [No HSM] : no hepatosplenomegaly appreciated [No Masses] : no masses were palpated [Normal Bowel Sounds] : normal bowel sounds [No Umbilical Hernia] : no umbilical hernia [No Sacral Dimples] : no sacral dimples [No Scoliosis] : no scoliosis [Normal Range of Motion] : normal range of motion [Normal Posture] : normal posture [No evidence of Hip Dislocation] : no evidence of hip dislocation [Active and Alert] : active and alert [Normal truncal tone] : normal truncal tone [Normal deep tendon reflexes] : normal deep tendon reflexes [No head lag] : no head lag [Symmetric Shaka] : the [place] reflex was ~L present [Fixes On Faces] : fixes on faces [Follows to Midline] : the gaze follows to the midline [Turns Head Side to Side in Prone] : turns head side to side in prone [Hands Open] : the hands open [de-identified] : (R) IGH, reduible and  (R) undescended testes [de-identified] : hypotonia x 4

## 2025-03-27 NOTE — CONSULT LETTER
[Dear  ___] : Dear  [unfilled], [Courtesy Letter:] : I had the pleasure of seeing your patient, [unfilled], in my office today. [Sincerely,] : Sincerely, [FreeTextEntry3] : Heidy Bridges MD Attending Neonatologist [facility]

## 2025-03-27 NOTE — BIRTH HISTORY
[Birthweight ___ kg] : weight [unfilled] kg [de-identified] : C/S for a non reassuring fetal heart tracing of a 34 weeks gestation baby boy. Mother is a 44 year old  with prenatal labs pending, blood type O pos. Pregnancy complicated by early onset fetal growth restriction, AEDV, GDMA1, and chromosomal abnormality of trisomy 7 Nasim Silver syndrome with fetal anomalies noted for absent nasal bone, Right pelvic kidney, echogenic bowel and single umbilical artery with a normal fetal echo. Mother presented to labor and delivery from office with new onset oligohydramnios and a non reassuring fetal heart tracing. AROM at delivery with clear fluid, infant emerged cephalic with good tone and weak cry. Infant placed in warmer on heated mattress, stimulated and suctioned, CPAP applied at 2 mins of life for retractions and duskiness, max settings peep 6 40% to achieve sats in target range for age. Infant transferred to NICU on CPAP 6 25% for further evaluation and management. Apgars 8/9. [de-identified] : SGS  Hypoglycemia trisomy 7 Wu silver syndrome    33 weeks

## 2025-03-27 NOTE — END OF VISIT
[FreeTextEntry3] : seen with NP [Time Spent: ___ minutes] : I have spent [unfilled] minutes of time on the encounter which excludes teaching and separately reported services.

## 2025-03-27 NOTE — REVIEW OF SYSTEMS
[Immunizations are up to date] : Immunizations are up to date [RSV prophylaxis received] : RSV prophylaxis received [Swelling in Scrotum] : swelling in scrotum [Nl] : Allergy/Immunology [Rhinorrhea] : rhinorrhea [Dec Urine Output] : no oliguria [FreeTextEntry2] : poor feeding since 3/21 frenuelectomy [FreeTextEntry1] : (R) IGH  Undescended (R) testes [de-identified] : (R) plagiocephaly

## 2025-03-27 NOTE — CONSULT LETTER
[Dear  ___] : Dear  [unfilled], [Courtesy Letter:] : I had the pleasure of seeing your patient, [unfilled], in my office today. [Sincerely,] : Sincerely, [FreeTextEntry3] : Heidy Bridges MD Attending Neonatologist [facility]

## 2025-03-27 NOTE — CONSULT LETTER
[Dear  ___] : Dear  [unfilled], [Courtesy Letter:] : I had the pleasure of seeing your patient, [unfilled], in my office today. [Sincerely,] : Sincerely, [FreeTextEntry3] : Heidy Bridges MD Attending Neonatologist [facility]

## 2025-03-27 NOTE — REASON FOR VISIT
[F/U - Hospitalization] : follow-up of a recent hospitalization for [Weight Check] : weight check [Developmental Delay] : developmental delay [Medical Records] : medical records [Parents] : parents [FreeTextEntry3] : 33 wks  Wu silver syndrome Trisomy 7

## 2025-03-27 NOTE — PHYSICAL EXAM
[Pink] : pink [Well Perfused] : well perfused [No Rashes] : no rashes [No Birth Marks] : no birth marks [Conjunctiva Clear] : conjunctiva clear [PERRL] : pupils were equal, round, reactive to light  [Ears Normal Position and Shape] : normal position and shape of ears [Nares Patent] : nares patent [No Nasal Flaring] : no nasal flaring [Moist and Pink Mucous Membranes] : moist and pink mucous membranes [Palate Intact] : palate intact [No Torticollis] : no torticollis [No Neck Masses] : no neck masses [Symmetric Expansion] : symmetric chest expansion [No Retractions] : no retractions [Clear to Auscultation] : lungs clear to auscultation  [Normal S1, S2] : normal S1 and S2 [Regular Rhythm] : regular rhythm [No Murmur] : no mumur [Normal Pulses] : normal pulses [Non Distended] : non distended [No HSM] : no hepatosplenomegaly appreciated [No Masses] : no masses were palpated [Normal Bowel Sounds] : normal bowel sounds [No Umbilical Hernia] : no umbilical hernia [No Sacral Dimples] : no sacral dimples [No Scoliosis] : no scoliosis [Normal Range of Motion] : normal range of motion [Normal Posture] : normal posture [No evidence of Hip Dislocation] : no evidence of hip dislocation [Active and Alert] : active and alert [Normal truncal tone] : normal truncal tone [Normal deep tendon reflexes] : normal deep tendon reflexes [No head lag] : no head lag [Symmetric Shaka] : the [place] reflex was ~L present [Fixes On Faces] : fixes on faces [Follows to Midline] : the gaze follows to the midline [Turns Head Side to Side in Prone] : turns head side to side in prone [Hands Open] : the hands open [de-identified] : (R) IGH, reduible and  (R) undescended testes [de-identified] : hypotonia x 4

## 2025-03-28 NOTE — HISTORY OF PRESENT ILLNESS
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 Feeding and growth difficulties are the most common issues. Hypoglycemia can occur early in life and during periods of fasting. Developmental delays early in childhood are frequently observed, and there may be a genotype-phenotype correlation for the presence and severity of delays. Other features seen in RSS in order of decreasing frequency include shoulder dimples, micrognathia, low muscle mass, excessive sweating, low-set/posteriorly set ears, down-turned corners of the mouth, high pitched voice, precocious puberty, prominent heels, delayed closure of the anterior fontanelle, male genital abnormalities, speech delay, constipation, crowded teeth, motor delay, toe syndactyly and scoliosis/kyphosis.    was noted to have triangular facial features, cryptorchidism, growth difficulties, feeding difficulties, and small R kidney.   is being followed by pediatric endocrinology. He has no history of hypoglycemia at this time. On 3/17, glucose was normal. Since he is gaining weight and not showing signs of hypotonia, GH injections are not recommended at this time. He has a history of 4-5 days without stooling.  was seen by ENT for tongue tie frenulectomy on 3/21 but continues to have poor feeding. At his neonatology appointment on Wednesday, weight, height, and head circumference were all <3% for corrected age. Nasolaryngoscopy noted mild laryngomalacia. Parents report that  tracks and turns to sound. He has a social smile. He was seen yesterday by surgery due to his cryptorchidism- related inguinal hernia. Hernia repair planned for when  reaches 52 weeks corrected gestational age.

## 2025-03-28 NOTE — REASON FOR VISIT
[Initial Consultation] : an initial consultation [Phimosis] : phimosis [Undescended testicle] : undescended testicle [PCP] : ~pcp~ [Parents] : parents

## 2025-03-28 NOTE — FAMILY HISTORY
[FreeTextEntry1] :  is the child of a nonconsanguineous couple. Maternal ancestry is reported as from the [place]. Paternal ancestry is reported as from [place] and [place]. Father is living at age 62. Reported height of 5'5". Mother is living at age 44. Reported height of 5'2". The patient has two maternal half siblings and two paternal half siblings all reported alive and well.

## 2025-03-28 NOTE — BIRTH HISTORY
[FreeTextEntry1] :  is the 2 pound, 9 ounce product of a 33.1w gestation, complicated by early onset fetal growth restriction, AEDV, oligohydramnios, and maternal gestational diabetes (diet controlled). Fetal US noted absent nasal bone, a right pelvic kidney, echogenic bowel, and a single umbilical artery. Fetal echo was normal. NIPS was screen positive for Trisomy 7. Amniocentesis was done. 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 This is consistent with a diagnosis of Wu Silver syndrome. Parents received extensive genetic counseling.   was delivered at [facility] to a 44-year-old  mother, via  due to NRFHT. Apgars 8/9. Height 38.5cm. Head circumference 26.5cm. At 2 MOL he exhibited respiratory distress and CPAP was initiated. He was transferred to the NICU. NICU exam noted right cryptorchidism, incomplete foreskin, and two vessels of the umbilicus. Testicular US noted bilateral undescended testes and bilateral hydroceles. Head US noted germinal matrix hemorrhage. Renal US noted small R kidney.  Genetics was consulted. Virtual examination noted triangular face. A confirmatory microarray was recommended. The  CMA report revealed that the patient carries chromosome 7 homologs that share identical sequences from cytogenetic band 7p22.3 to 7p21.3, from 7p14.1 to 7p11.1, and from 7q11.21 to 7q21.3, encompassing at least 59.5 Mb. These substantial homozygosity blocks, in the absence of similar blocks elsewhere, potentially indicate uniparental disomy, aligning with the established involvement of maternal UPD7 in approximately 10% of RSS cases.  NICU course complicated by metabolic acidosis (improved prior to discharge), moderate malnutrition improved with better bottle nipple (required G-tube), thrombocytopenia, and hyperbilirubinemia.  passed his state  screen and  hearing screen, and was discharged on DOL 48.

## 2025-03-28 NOTE — REASON FOR VISIT
[Parents] : parents [Other:____] : [unfilled] [Other Location: e.g. Home (Enter Location, City,State)___] : at [unfilled] [Telehealth (audio & video)] : This visit was provided via telehealth using real-time 2-way audio visual technology. [Language Line ] : provided by Language Line   [Interpreters_IDNumber] : #[id number], #[id number] [TWNoteComboBox1] : Ghanaian [FreeTextEntry3] : .   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 He was accompanied by his parents. Genetic counselor Shanae Meyer also participated in this appointment. Divehi interpreters (#[id number], #[id number]) utilized.

## 2025-03-28 NOTE — ASSESSMENT
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 The expected symptoms, management, and inheritance pattern for this condition were discussed with parents.   It is important that  see different doctors for his different associated symptoms, and to monitor for potential future symptoms of the condition: Urology/surgery- cryptorchidism and inguinal hernia Endocrinology- monitor for hypoglycemia and growth hormone concerns GI- feeding difficulties and adequate calorie intake Developmental pediatrics- monitoring for development delay (especially considering prematurity) and making proper referrals for therapies. Recent research suggests that affected individuals can catch up on delays by adulthood, even if those delays happen early on.  Recurrence risk in future children is low given, UPD7 mode of causation  Plan: dev peds referral f/u with genetics if new or concerning symptoms appear or if they have any questions

## 2025-03-28 NOTE — REASON FOR VISIT
[Parents] : parents [Other:____] : [unfilled] [Other Location: e.g. Home (Enter Location, City,State)___] : at [unfilled] [Telehealth (audio & video)] : This visit was provided via telehealth using real-time 2-way audio visual technology. [Language Line ] : provided by Language [employer]   [Interpreters_IDNumber] : #[id number], #[id number] [TWNoteComboBox1] : Trinidadian [FreeTextEntry3] : .   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 He was accompanied by his parents. Genetic counselor Shanae Meyer also participated in this appointment. Thai interpreters (#[id number], #[id number]) utilized.

## 2025-03-28 NOTE — BIRTH HISTORY
[FreeTextEntry1] :  is the 2 pound, 9 ounce product of a 33.1w gestation, complicated by early onset fetal growth restriction, AEDV, oligohydramnios, and maternal gestational diabetes (diet controlled). Fetal US noted absent nasal bone, a right pelvic kidney, echogenic bowel, and a single umbilical artery. Fetal echo was normal. NIPS was screen positive for Trisomy 7. Amniocentesis was done. 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 This is consistent with a diagnosis of Wu Silver syndrome. Parents received extensive genetic counseling.   was delivered at [facility] to a 44-year-old  mother, via  due to NRFHT. Apgars 8/9. Height 38.5cm. Head circumference 26.5cm. At 2 MOL he exhibited respiratory distress and CPAP was initiated. He was transferred to the NICU. NICU exam noted right cryptorchidism, incomplete foreskin, and two vessels of the umbilicus. Testicular US noted bilateral undescended testes and bilateral hydroceles. Head US noted germinal matrix hemorrhage. Renal US noted small R kidney.  Genetics was consulted. Virtual examination noted triangular face. A confirmatory microarray was recommended. The  CMA report revealed that the patient carries chromosome 7 homologs that share identical sequences from cytogenetic band 7p22.3 to 7p21.3, from 7p14.1 to 7p11.1, and from 7q11.21 to 7q21.3, encompassing at least 59.5 Mb. These substantial homozygosity blocks, in the absence of similar blocks elsewhere, potentially indicate uniparental disomy, aligning with the established involvement of maternal UPD7 in approximately 10% of RSS cases.  NICU course complicated by metabolic acidosis (improved prior to discharge), moderate malnutrition improved with better bottle nipple (required G-tube), thrombocytopenia, and hyperbilirubinemia.  passed his state  screen and  hearing screen, and was discharged on DOL 48.

## 2025-03-28 NOTE — HISTORY OF PRESENT ILLNESS
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 Feeding and growth difficulties are the most common issues. Hypoglycemia can occur early in life and during periods of fasting. Developmental delays early in childhood are frequently observed, and there may be a genotype-phenotype correlation for the presence and severity of delays. Other features seen in RSS in order of decreasing frequency include shoulder dimples, micrognathia, low muscle mass, excessive sweating, low-set/posteriorly set ears, down-turned corners of the mouth, high pitched voice, precocious puberty, prominent heels, delayed closure of the anterior fontanelle, male genital abnormalities, speech delay, constipation, crowded teeth, motor delay, toe syndactyly and scoliosis/kyphosis.    was noted to have triangular facial features, cryptorchidism, growth difficulties, feeding difficulties, and small R kidney.   is being followed by pediatric endocrinology. He has no history of hypoglycemia at this time. On 3/17, glucose was normal. Since he is gaining weight and not showing signs of hypotonia, GH injections are not recommended at this time. He has a history of 4-5 days without stooling.  was seen by ENT for tongue tie frenulectomy on 3/21 but continues to have poor feeding. At his neonatology appointment on Wednesday, weight, height, and head circumference were all <3% for corrected age. Nasolaryngoscopy noted mild laryngomalacia. Parents report that  tracks and turns to sound. He has a social smile. He was seen yesterday by surgery due to his cryptorchidism- related inguinal hernia. Hernia repair planned for when  reaches 52 weeks corrected gestational age.

## 2025-03-29 NOTE — CONSULT LETTER
[Dear  ___] : Dear  [unfilled], [Consult Letter:] : I had the pleasure of evaluating your patient, [unfilled]. [Please see my note below.] : Please see my note below. [Consult Closing:] : Thank you very much for allowing me to participate in the care of this patient.  If you have any questions, please do not hesitate to contact me. [Sincerely,] : Sincerely, [FreeTextEntry2] : Mari Huntley MD [address] [place] [address] [phone] [FreeTextEntry3] : Dung Krause MD Division of Pediatric, General, Thoracic and Endoscopic Surgery [facility]

## 2025-03-29 NOTE — REASON FOR VISIT
[Initial - Scheduled] : an initial, scheduled visit with concerns of [Inguinal Hernia] : inguinal hernia [Patient] : patient [Parents] : parents [FreeTextEntry4] : Dr Mari Huntley

## 2025-03-29 NOTE — ASSESSMENT
Eliquis      Last Written Prescription Date:  1/2/25  Last Fill Quantity: 90,   # refills: 0  Last Office Visit: 4/26/24  Future Office visit:    Next 5 appointments (look out 90 days)      Apr 25, 2025 10:00 AM  Return Visit with Montserrat Lobo MD  [facility] ([facility]) [address]  [place] [address]  [phone]               
[FreeTextEntry1] :  is a 2 month old boy, ex 33 weeker, with a reducible right inguinal hernia and an undescended right testicle. I educated mom and dad about each of these findings and offered reassurance.  With regards to his inguinal hernia, I recommended a laparoscopic right, possible left, inguinal hernia repair. I reviewed the indications, risks, benefits and alternatives of the procedure. The risks discussed included but were not limited to bleeding, infection, injury to intra-abdominal/pelvic contents, injury to spermatic cord/testicular loss, postoperative hydrocele and hernia recurrence. I counseled them about the possibility of developing an incarcerated hernia as well as the signs/symptoms to look out for and they know to bring  to the emergency room with any concerns.  I reviewed optimal timing of the surgery with mom and dad.  I discussed with them that delaying his procedure until he is 52 weeks post-gestational age will decrease the risk of post-operative apnea and likely allow for a same-day procedure without inpatient observation.    With regards to the undescended right testicle, I educated them about this finding. In isolation, they understand we would wait until  is over 6 months (given his prematurity) to give the testicle time to descend.   I discussed with them that given we are waiting almost 2 months to proceed with the hernia repair, it is possible that his testicle will be further descended at that time, given it is already located near the upper aspect of the scrotum.  They understand that should it be further descended that we would likely hold off on any intervention to the testicle at the time of the hernia repair given the likelihood that it will spontaneously descend and not require any further intervention.  They understand we could always return to the OR should that be necessary in the future and that we will examine the testicle at the time of the procedure to make a more definitive plan.  I reviewed the implications of an undescended testicle as well as the risks of orchiopexy and family understands the benefits of giving the testicle sufficient time to descend without operative intervention.   Mom and dad are also interested in proceeding with a circumcision at the time of the procedure.  I reviewed with them that a circumcision is not a medically necessary procedure, and I reviewed the indications for circumcision.  I reviewed the risks, benefits and alternatives of the procedure.  The risks discussed included but were not limited to bleeding, infection, injury to adjacent structures, poor cosmetic result, postoperative penile adhesions, need for further procedures, etc.  I reviewed the postoperative expectations and instructions.  Mom and dad would like to proceed.    We have scheduled his procedure in May when he is > 52 weeks gestational age.  They know to contact me sooner with any further questions or concerns. 
[FreeTextEntry1] :  is a 2 month old boy, ex 33 weeker, with a reducible right inguinal hernia and an undescended right testicle. I educated mom and dad about each of these findings and offered reassurance.  With regards to his inguinal hernia, I recommended a laparoscopic right, possible left, inguinal hernia repair. I reviewed the indications, risks, benefits and alternatives of the procedure. The risks discussed included but were not limited to bleeding, infection, injury to intra-abdominal/pelvic contents, injury to spermatic cord/testicular loss, postoperative hydrocele and hernia recurrence. I counseled them about the possibility of developing an incarcerated hernia as well as the signs/symptoms to look out for and they know to bring  to the emergency room with any concerns.  I reviewed optimal timing of the surgery with mom and dad.  I discussed with them that delaying his procedure until he is 52 weeks post-gestational age will decrease the risk of post-operative apnea and likely allow for a same-day procedure without inpatient observation.    With regards to the undescended right testicle, I educated them about this finding. In isolation, they understand we would wait until  is over 6 months (given his prematurity) to give the testicle time to descend.   I discussed with them that given we are waiting almost 2 months to proceed with the hernia repair, it is possible that his testicle will be further descended at that time, given it is already located near the upper aspect of the scrotum.  They understand that should it be further descended that we would likely hold off on any intervention to the testicle at the time of the hernia repair given the likelihood that it will spontaneously descend and not require any further intervention.  They understand we could always return to the OR should that be necessary in the future and that we will examine the testicle at the time of the procedure to make a more definitive plan.  I reviewed the implications of an undescended testicle as well as the risks of orchiopexy and family understands the benefits of giving the testicle sufficient time to descend without operative intervention.   Mom and dad are also interested in proceeding with a circumcision at the time of the procedure.  I reviewed with them that a circumcision is not a medically necessary procedure, and I reviewed the indications for circumcision.  I reviewed the risks, benefits and alternatives of the procedure.  The risks discussed included but were not limited to bleeding, infection, injury to adjacent structures, poor cosmetic result, postoperative penile adhesions, need for further procedures, etc.  I reviewed the postoperative expectations and instructions.  Mom and dad would like to proceed.    We have scheduled his procedure in May when he is > 52 weeks gestational age.  They know to contact me sooner with any further questions or concerns.

## 2025-03-29 NOTE — CONSULT LETTER
[Dear  ___] : Dear  [unfilled], [Consult Letter:] : I had the pleasure of evaluating your patient, [unfilled]. [Please see my note below.] : Please see my note below. [Consult Closing:] : Thank you very much for allowing me to participate in the care of this patient.  If you have any questions, please do not hesitate to contact me. [Sincerely,] : Sincerely, [FreeTextEntry2] : Mari Huntley MD [address] [place] [address] [phone] [FreeTextEntry3] : Dung Krause MD Division of Pediatric, General, Thoracic and Endoscopic Surgery [facility]

## 2025-03-29 NOTE — HISTORY OF PRESENT ILLNESS
[FreeTextEntry1] :  is a 2 month old 33 weeker, here today to be evaluated for concerns of a right inguinal hernia and an undescended right testicle. He was admitted to the NICU after birth, mainly for feeding concerns per mom and dad.  He has been doing relatively well since discharge with some difficulty with weight gain.  Mom and dad have noticed a right undescended testicle since birth and they have more recently noticed intermittent swelling of the right groin area.  They have not noticed any swelling of the left groin.   does not seem to have any pain at the site.  He is tolerating feeds without significant emesis.  He is having normal bowel movements and normal wet diapers.  They are here today to discuss repair of the inguinal hernia as well as the treatment options for the right testicle.  They are also interested in circumcision.

## 2025-03-29 NOTE — ADDENDUM
[FreeTextEntry1] : Documented by Patrick Tolentino acting as a scribe for Dr. Krause on 03/27/2025.   All medical record entries made by the Scribe were at my, Dr. Krause, direction and personally dictated by me on 03/27/2025. I have reviewed the chart and agree that the record accurately reflects my personal performances of the history, physical exam, assessment and plan. I have also personally directed, reviewed, and agree with the instructions.

## 2025-03-29 NOTE — PHYSICAL EXAM
[NL] : grossly intact [Circumcised] : not circumcised [Testicle descended on left] : testicle descended on left [TextBox_67] : Reducible right inguinal hernia, right testicle high riding in lower inguinal canal/upper scrotum, able to be brought down to base of scrotum but ascends immediately, left testicle palpable in base of scrotum, no appreciable left inguinal hernia, redundant foreskin, no appreciable hypospadias

## 2025-04-07 NOTE — DISCUSSION/SUMMARY
[No Elimination Concerns] : elimination [Continue Regimen] : feeding [No Skin Concerns] : skin [Normal Sleep Pattern] : sleep [ Infant] :  infant [Poor Weight Gain] : poor weight gain [Delayed-Normal For Gest Age] : delayed but normal for patient's gestational age [None] : no medical problems [Anticipatory Guidance Given] : Anticipatory guidance addressed as per the history of present illness section [No Medication Changes] : No medication changes at this time [Parent/Guardian] : Parent/Guardian [de-identified] : deferred to next appointment in 2 weeks [FreeTextEntry1] : 3 month old with developmental delays/ and poor feeding. refer to pediatric GI for further evaluation.

## 2025-04-07 NOTE — DISCUSSION/SUMMARY
[No Elimination Concerns] : elimination [Continue Regimen] : feeding [No Skin Concerns] : skin [Normal Sleep Pattern] : sleep [ Infant] :  infant [Poor Weight Gain] : poor weight gain [Delayed-Normal For Gest Age] : delayed but normal for patient's gestational age [None] : no medical problems [Anticipatory Guidance Given] : Anticipatory guidance addressed as per the history of present illness section [No Medication Changes] : No medication changes at this time [Parent/Guardian] : Parent/Guardian [de-identified] : deferred to next appointment in 2 weeks [FreeTextEntry1] : 3 month old with developmental delays/ and poor feeding. refer to pediatric GI for further evaluation.

## 2025-04-07 NOTE — PHYSICAL EXAM
[Alert] : alert [Acute Distress] : no acute distress [Normocephalic] : normocephalic [Flat Open Anterior Dillard] : flat open anterior fontanelle [PERRL] : PERRL [Red Reflex Bilateral] : red reflex bilateral [Normally Placed Ears] : normally placed ears [Auricles Well Formed] : auricles well formed [Clear Tympanic membranes] : clear tympanic membranes [Light reflex present] : light reflex present [Bony landmarks visible] : bony landmarks visible [Discharge] : no discharge [Nares Patent] : nares patent [Palate Intact] : palate intact [Uvula Midline] : uvula midline [Supple, full passive range of motion] : supple, full passive range of motion [Palpable Masses] : no palpable masses [Symmetric Chest Rise] : symmetric chest rise [Clear to Auscultation Bilaterally] : clear to auscultation bilaterally [Regular Rate and Rhythm] : regular rate and rhythm [S1, S2 present] : S1, S2 present [Murmurs] : no murmurs [+2 Femoral Pulses] : +2 femoral pulses [Soft] : soft [Tender] : nontender [Distended] : not distended [Bowel Sounds] : bowel sounds present [Hepatomegaly] : no hepatomegaly [Splenomegaly] : no splenomegaly [Normal external genitalia] : normal external genitalia [Central Urethral Opening] : central urethral opening [Testicles Descended Bilaterally] : testicles descended bilaterally [Normally Placed] : normally placed [No Abnormal Lymph Nodes Palpated] : no abnormal lymph nodes palpated [Castellon-Ortolani] : negative Castellon-Ortolani [Symmetric Flexed Extremities] : symmetric flexed extremities [Spinal Dimple] : no spinal dimple [Tuft of Hair] : no tuft of hair [Startle Reflex] : startle reflex present [Suck Reflex] : suck reflex present [Rooting] : rooting reflex present [Palmar Grasp] : palmar grasp reflex present [Plantar Grasp] : plantar grasp reflex present [Symmetric [place]] : symmetric [place] [Rash and/or lesion present] : no rash/lesion

## 2025-04-07 NOTE — HISTORY OF PRESENT ILLNESS
[Parents] : parents [Formula ___ oz/feed] : [unfilled] oz of formula per feed [Hours between feeds ___] : Child is fed every [unfilled] hours [Vitamins ___] : Patient takes [unfilled] vitamins daily [Normal] : Normal [___ voids per day] : [unfilled] voids per day [Frequency of stools: ___] : Frequency of stools: [unfilled]  stools [every ___ day(s)] : every [unfilled] day(s). [Loose] : loose consistency [In Bassinet/Crib] : sleeps in bassinet/crib [On back] : sleeps on back [Pacifier use] : Pacifier use [No] : No cigarette smoke exposure [Water heater temperature set at <120 degrees F] : Water heater temperature set at <120 degrees F [Rear facing car seat in back seat] : Rear facing car seat in back seat [Carbon Monoxide Detectors] : Carbon monoxide detectors at home [Smoke Detectors] : Smoke detectors at home. [NO] : No [Breast milk] : breast milk [Co-sleeping] : no co-sleeping [Exposure to electronic nicotine delivery system] : No exposure to electronic nicotine delivery system [At risk for exposure to TB] : Not at risk for exposure to Tuberculosis  [FreeTextEntry7] : not eating well/ taking less per feed /  [de-identified] : sharan sure

## 2025-04-07 NOTE — PHYSICAL EXAM
[Alert] : alert [Acute Distress] : no acute distress [Normocephalic] : normocephalic [Flat Open Anterior [place]] : flat open anterior fontanelle [PERRL] : PERRL [Red Reflex Bilateral] : red reflex bilateral [Normally Placed Ears] : normally placed ears [Auricles Well Formed] : auricles well formed [Clear Tympanic membranes] : clear tympanic membranes [Light reflex present] : light reflex present [Bony landmarks visible] : bony landmarks visible [Discharge] : no discharge [Nares Patent] : nares patent [Palate Intact] : palate intact [Uvula Midline] : uvula midline [Supple, full passive range of motion] : supple, full passive range of motion [Palpable Masses] : no palpable masses [Symmetric Chest Rise] : symmetric chest rise [Clear to Auscultation Bilaterally] : clear to auscultation bilaterally [Regular Rate and Rhythm] : regular rate and rhythm [S1, S2 present] : S1, S2 present [Murmurs] : no murmurs [+2 Femoral Pulses] : +2 femoral pulses [Soft] : soft [Tender] : nontender [Distended] : not distended [Bowel Sounds] : bowel sounds present [Hepatomegaly] : no hepatomegaly [Splenomegaly] : no splenomegaly [Normal external genitalia] : normal external genitalia [Central Urethral Opening] : central urethral opening [Testicles Descended Bilaterally] : testicles descended bilaterally [Normally Placed] : normally placed [No Abnormal Lymph Nodes Palpated] : no abnormal lymph nodes palpated [Castellon-Ortolani] : negative Castellon-Ortolani [Symmetric Flexed Extremities] : symmetric flexed extremities [Spinal Dimple] : no spinal dimple [Tuft of Hair] : no tuft of hair [Startle Reflex] : startle reflex present [Suck Reflex] : suck reflex present [Rooting] : rooting reflex present [Palmar Grasp] : palmar grasp reflex present [Plantar Grasp] : plantar grasp reflex present [Symmetric Shaka] : symmetric [place] [Rash and/or lesion present] : no rash/lesion

## 2025-04-07 NOTE — HISTORY OF PRESENT ILLNESS
[Parents] : parents [Formula ___ oz/feed] : [unfilled] oz of formula per feed [Hours between feeds ___] : Child is fed every [unfilled] hours [Vitamins ___] : Patient takes [unfilled] vitamins daily [Normal] : Normal [___ voids per day] : [unfilled] voids per day [Frequency of stools: ___] : Frequency of stools: [unfilled]  stools [every ___ day(s)] : every [unfilled] day(s). [Loose] : loose consistency [In Bassinet/Crib] : sleeps in bassinet/crib [On back] : sleeps on back [Pacifier use] : Pacifier use [No] : No cigarette smoke exposure [Water heater temperature set at <120 degrees F] : Water heater temperature set at <120 degrees F [Rear facing car seat in back seat] : Rear facing car seat in back seat [Carbon Monoxide Detectors] : Carbon monoxide detectors at home [Smoke Detectors] : Smoke detectors at home. [NO] : No [Breast milk] : breast milk [Co-sleeping] : no co-sleeping [Exposure to electronic nicotine delivery system] : No exposure to electronic nicotine delivery system [At risk for exposure to TB] : Not at risk for exposure to Tuberculosis  [FreeTextEntry7] : not eating well/ taking less per feed /  [de-identified] : sharan sure

## 2025-04-17 NOTE — PHYSICAL EXAM
[TextBox_92] : PENIS: Straight uncircumcised penis with phimosis. Meatus not visible.   SCROTUM: Bilaterally symmetric testes without palpable mass, hernia or hydrocele. Right inguinal testis. Left testis in dependent position.

## 2025-04-17 NOTE — CONSULT LETTER
[FreeTextEntry1] : Dear Dr. CLAIR BAKER ,  I had the pleasure of consulting on ASHLEE ASUNCIONOBI today.  Below is my note regarding the office visit today.  Thank you so very much for allowing me to participate in ASHLEE's  care.  Please don't hesitate to call me should any questions or issues arise .  Sincerely,   Guzman Thakur MD, FACS, [facility] Chief, Pediatric Urology Professor of Urology and Pediatrics [employer]  President, American U[employer] Association - [place] Section Past-President, Societies for Pediatric Urology

## 2025-04-17 NOTE — CONSULT LETTER
[FreeTextEntry1] : Dear Dr. CLAIR BAKER ,  I had the pleasure of consulting on ASHLEE ASUNCIONOBI today.  Below is my note regarding the office visit today.  Thank you so very much for allowing me to participate in ASHLEE's  care.  Please don't hesitate to call me should any questions or issues arise .  Sincerely,   Guzman Thakur MD, FACS, [facility] Chief, Pediatric Urology Professor of Urology and Pediatrics [employer] [facility] [employer]  President, American U[employer] Association - [place] Section Past-President, Societies for Pediatric Urology

## 2025-04-17 NOTE — CONSULT LETTER
[FreeTextEntry1] : Dear Dr. CLAIR BAKER ,  I had the pleasure of consulting on ASHLEE ASUNCIONOBI today.  Below is my note regarding the office visit today.  Thank you so very much for allowing me to participate in ASHLEE's  care.  Please don't hesitate to call me should any questions or issues arise .  Sincerely,   Guzman Thakur MD, FACS, [facility] Chief, Pediatric Urology Professor of Urology and Pediatrics [facility] [employer]  President, American Urological Association - [place] Section Past-President, Societies for Pediatric Urology

## 2025-04-17 NOTE — HISTORY OF PRESENT ILLNESS
[TextBox_4] : ASHLEE is here today for evaluation.  He was born at 33 weeker after an unassisted conception and uneventful pregnancy and delivery.  A deformity of the penis was detected in the nursery which prevented circumcision as . Making ample wet diapers.  No infections.  He was noted recently to have an undescended testicle on the right side. This was noted at birth.  The testis has NOT been descending with time.  No history of trauma or infection or inflammation.  Recently he was noted to have swelling in the RIGHT scrotum.  It does change sizes during the day, especially with abdominal straining, being smallest while sleeping and largest during the day and with straining.  There is no associated pain or nausea/vomiting. No family history of hernias

## 2025-04-17 NOTE — ASSESSMENT
[FreeTextEntry1] :  has a palpable undescended testis.  I had a long discussion regarding the undescended testis.  We discussed the causes, anatomy using drawings, and the management options.  We discussed the risks of possible decreased fertility and increased risk of malignancy if not corrected. The general principles of the operation were discussed and drawn.    We discussed the anticipated postoperative course, including wound care and medications. The probability of surgical success was discussed as well as the risk of possible complications which include but are not limited to infection, bleeding, incomplete positioning of the testis, injury to the blood supply of the testicle and/or epididymis, injury to the vas deferens, testicle, or epididymis, testicular and/or epididymal ischemia, atrophy or loss, infertility, hernia formation.  Patient's parent stated understanding the risks, benefits and alternatives, and that all questions were answered, and understood. The decision to proceed with surgery was made. The surgery will take place around 1 year of age, with an office visit a few weeks before to reexamine the testes.    In addition,  has phimosis. We discussed the condition and its implications and then the management options, including monitoring, use of medication, and circumcision. The risks and benefits and possible complications of each option (including but not limited to reaction to steroids, bleeding, injury, incomplete circumcision and need for additional injury) was discussed and all questions were answered. The decision for circumcision was made. Due to his age, the circumcision will be performed in the operating room under anesthesia.

## 2025-04-21 NOTE — HISTORY OF PRESENT ILLNESS
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   Parents report currently he is taking Neosure 27kcal 30-45ml q3h including overnight, uses Dr. Garcia's 1 nipple with a green Dr. Brown anti-colic modifier piece in bottle. Formula is mixed by measuring 45-50ml water first then adding 1 scoop formula. It takes him about 30min to feed, and he falls asleep about 50% of the time and has to be woken to finish. Mom pumps EHM also, fortifies with 1 teaspoon of Neosure powder per 45-50ml EHM; estimates 2-3 EHM feeds per day. He does not always finish feeds, minimum he takes is 25ml but that is rare; usually takes 30-40ml more consistently, with EHM he is more likely to take 45ml. Mom always feeds him at home, no other family members feed him. He stools once every 2-3 days, soft, dark green, no blood or mucous. Voids 6+ times per day.   Prior to tongue tie release on 3/21, he was taking 45ml and was sucking hard, but they think he was straining. Right after surgery, they noticed he had a decrease in volume, but he does seem to be working less hard to feed in terms of sucking less hard. This is also when he began to have daily formula-colored emesis 1-2 times per day. He spits up with most feeds, but parents report forceful vomiting 1-2/day got more consistent after surgery. However, he has gone 2 days without it so far without intervention. He vomits more with formula than EHM. Parents feel this started around 3 weeks ago that he started vomiting daily. Mom holds him upright for 10 minutes minimum and always burps him after feeds. Parents deny fevers, cough, runny nose, or inconsolable fussiness. No cyanosis, sweating, or tachypnea with feeds.   BHx: Born 33 weeks via CS for NRFHT, 2 vessel umbilicus. At 2mo transferred to NICU for CPAP, noted to have triangular face, genetics sent microarray confirming Nasim Silver syndrome (a/w growth deficiency particularly weight, hypoglycemia, developmental delays, micrognathia,  abnormalities). HUS w/ germinal matrix hemorrhage. MAURY w/ small R kidney. Normal NBS.  PMH: R cryptorchidism, incomplete foreskin PSH: Tongue tie release 3/21/25 (plans for R inguinal hernia repair also in May)  Meds: MVS + Fe, Mylicon PRN  All: NKDA  FMH: Denies any conditions in immediate family members

## 2025-04-21 NOTE — REASON FOR VISIT
[Consultation] : a consultation visit [Mother] : mother [Father] : father [Interpreters_IDNumber] : [id number] [Interpreters_FullName] : Fransisco [TWNoteComboBox1] : Jamaican

## 2025-04-21 NOTE — PHYSICAL EXAM
[NAD] : in no acute distress [PERRL] : pupils were equal, round, reactive to light  [Moist & Pink Mucous Membranes] : moist and pink mucous membranes [CTAB] : lungs clear to auscultation bilaterally [Regular Rate and Rhythm] : regular rate and rhythm [Normal S1, S2] : normal S1 and S2 [Soft] : soft  [Normal Bowel Sounds] : normal bowel sounds [No HSM] : no hepatosplenomegaly appreciated [Normal Position] : normal position [Well-Perfused] : well-perfused [icteric] : anicteric [Respiratory Distress] : no respiratory distress  [Murmur] : no murmur [Distended] : non distended [Tender] : non tender [Edema] : no edema [Cyanosis] : no cyanosis [Rash] : no rash [Jaundice] : no jaundice [FreeTextEntry1] : +small for age, otherwise well-appearing

## 2025-04-21 NOTE — REVIEW OF SYSTEMS
[Fever] : no fever [Congestion] : no congestion [Shortness Of Breath] : no shortness of breath [Cough] : no cough [Cyanosis] : no cyanosis [Diaphoresis] : no diaphoresis [Decreased Urination] : no decreased urination [Irritability] : no irritability [Rash] : no rash

## 2025-04-21 NOTE — REASON FOR VISIT
[Consultation] : a consultation visit [Mother] : mother [Father] : father [Interpreters_IDNumber] : [id number] [Interpreters_FullName] : Fransisco [TWNoteComboBox1] : Surinamese

## 2025-04-21 NOTE — HISTORY OF PRESENT ILLNESS
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   Parents report currently he is taking Neosure 27kcal 30-45ml q3h including overnight, uses Dr. Garcia's 1 nipple with a green Dr. Brown anti-colic modifier piece in bottle. Formula is mixed by measuring 45-50ml water first then adding 1 scoop formula. It takes him about 30min to feed, and he falls asleep about 50% of the time and has to be woken to finish. Mom pumps EHM also, fortifies with 1 teaspoon of Neosure powder per 45-50ml EHM; estimates 2-3 EHM feeds per day. He does not always finish feeds, minimum he takes is 25ml but that is rare; usually takes 30-40ml more consistently, with EHM he is more likely to take 45ml. Mom always feeds him at home, no other family members feed him. He stools once every 2-3 days, soft, dark green, no blood or mucous. Voids 6+ times per day.   Prior to tongue tie release on 3/21, he was taking 45ml and was sucking hard, but they think he was straining. Right after surgery, they noticed he had a decrease in volume, but he does seem to be working less hard to feed in terms of sucking less hard. This is also when he began to have daily formula-colored emesis 1-2 times per day. He spits up with most feeds, but parents report forceful vomiting 1-2/day got more consistent after surgery. However, he has gone 2 days without it so far without intervention. He vomits more with formula than EHM. Parents feel this started around 3 weeks ago that he started vomiting daily. Mom holds him upright for 10 minutes minimum and always burps him after feeds. Parents deny fevers, cough, runny nose, or inconsolable fussiness. No cyanosis, sweating, or tachypnea with feeds.   BHx: Born 33 weeks via CS for NRFHT, 2 vessel umbilicus. At 2mo transferred to NICU for CPAP, noted to have triangular face, genetics sent microarray confirming Nasim Silver syndrome (a/w growth deficiency particularly weight, hypoglycemia, developmental delays, micrognathia,  abnormalities). HUS w/ germinal matrix hemorrhage. MAURY w/ small R kidney. Normal NBS.  PMH: R cryptorchidism, incomplete foreskin PSH: Tongue tie release 3/21/25 (plans for R inguinal hernia repair also in May)  Meds: MVS + Fe, Mylicon PRN  All: NKDA  FMH: Denies any conditions in immediate family members

## 2025-04-21 NOTE — ASSESSMENT
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 His feeding volume and emesis seemed to get worse after tongue tie release on 3/21, however he has not had any forceful emesis for past 2 days without intervention. Overall, inadequate intake is the likely issue as he is only taking 30-45ml per feed of 27kcal Neosure/EHM, and it seems to be due to poor feeding skills; at end of visit parents fed him, and he seemed disinterested, formula pooled in mouth before he eventually swallowed, and he was shaking his head back and forth at times. Referred for SLP evaluation, and contacted them directly to try to get him seen soon; advised parents to ask EI for feeding eval if [facility] not able to get him scheduled soon.   Follow-up in 2 weeks, and parents instructed to call our office in 1 week to check in about how he is doing.   PLAN -Speech and swallow evaluation  -Famotidine q12h if he starts to have emesis again after visit  -Will consider Fortini 30kcal formula if he is continuing to have inadequate intake at 1 week phone call check-in

## 2025-04-24 NOTE — PHYSICAL EXAM
[Alert] : alert [Acute Distress] : no acute distress [Normocephalic] : normocephalic [Flat Open Anterior [place]] : flat open anterior fontanelle [PERRL] : PERRL [Red Reflex Bilateral] : red reflex bilateral [Normally Placed Ears] : normally placed ears [Auricles Well Formed] : auricles well formed [Clear Tympanic membranes] : clear tympanic membranes [Light reflex present] : light reflex present [Bony landmarks visible] : bony landmarks visible [Discharge] : no discharge [Nares Patent] : nares patent [Palate Intact] : palate intact [Uvula Midline] : uvula midline [Supple, full passive range of motion] : supple, full passive range of motion [Palpable Masses] : no palpable masses [Symmetric Chest Rise] : symmetric chest rise [Clear to Auscultation Bilaterally] : clear to auscultation bilaterally [Regular Rate and Rhythm] : regular rate and rhythm [S1, S2 present] : S1, S2 present [Murmurs] : no murmurs [+2 Femoral Pulses] : +2 femoral pulses [Soft] : soft [Tender] : nontender [Distended] : not distended [Bowel Sounds] : bowel sounds present [Hepatomegaly] : no hepatomegaly [Splenomegaly] : no splenomegaly [Normal external genitalia] : normal external genitalia [Circumcised] : not circumcised [Central Urethral Opening] : central urethral opening [Testicles Descended Bilaterally] : testicles descended bilaterally [Normally Placed] : normally placed [No Abnormal Lymph Nodes Palpated] : no abnormal lymph nodes palpated [Castellon-Ortolani] : negative Castellon-Ortolani [Symmetric Flexed Extremities] : symmetric flexed extremities [Spinal Dimple] : no spinal dimple [Tuft of Hair] : no tuft of hair [Startle Reflex] : startle reflex present [Suck Reflex] : suck reflex present [Rooting] : rooting reflex present [Palmar Grasp] : palmar grasp reflex present [Plantar Grasp] : plantar grasp reflex present [Symmetric Shaka] : symmetric [place] [Rash and/or lesion present] : no rash/lesion [FreeTextEntry6] : right inguinal hernia

## 2025-04-24 NOTE — DISCUSSION/SUMMARY
[No Elimination Concerns] : elimination [Continue Regimen] : feeding [No Skin Concerns] : skin [Normal Sleep Pattern] : sleep [ Infant] :  infant [Delayed Fine Motor Skills] : delayed fine motor skills [Delayed Gross Motor Skills] : delayed gross motor skills [Delayed Problem Solving Skills] : delayed problem solving skills [None] : no medical problems [Anticipatory Guidance Given] : Anticipatory guidance addressed as per the history of present illness section [Parental (Maternal) Well-Being] : parental (maternal) well-being [Infant-Family Synchrony] : infant-family synchrony [Nutritional Adequacy] : nutritional adequacy [Infant Behavior] : infant behavior [Safety] : safety [Age Approp Vaccines] : Age appropriate vaccines administered [No Medications] : ~He/She~ is not on any medications [Parent/Guardian] : Parent/Guardian [] : The components of the vaccine(s) to be administered today are listed in the plan of care. The disease(s) for which the vaccine(s) are intended to prevent and the risks have been discussed with the caretaker.  The risks are also included in the appropriate vaccination information statements which have been provided to the patient's caregiver.  The caregiver has given consent to vaccinate. [FreeTextEntry1] : Recommend exclusive breastfeeding, 8-12 feedings per day. Mother should continue prenatal vitamins and avoid alcohol. If formula is needed, recommend iron-fortified formulations, 2-4 oz every 3-4 hrs. When in car, patient should be in rear-facing car seat in back seat. Put baby to sleep on back, in own crib with no loose or soft bedding. Help baby to maintain sleep and feeding routines. May offer pacifier if needed. Continue tummy time when awake. Parents counseled to call if rectal temperature >100.4 degrees F.

## 2025-04-24 NOTE — DEVELOPMENTAL MILESTONES
[Smiles responsively] : smiles responsively [Vocalizes with simple cooing] : vocalizes with simple cooing [Lifts head and chest in prone] : does not lift head and chest in prone [Opens and shuts hands] : does not open and shut hands

## 2025-04-24 NOTE — HISTORY OF PRESENT ILLNESS
[Parents] : parents [Formula ___ oz/feed] : [unfilled] oz of formula per feed [Hours between feeds ___] : Child is fed every [unfilled] hours [Normal] : Normal [___ voids per day] : [unfilled] voids per day [Frequency of stools: ___] : Frequency of stools: [unfilled]  stools [every ___ day(s)] : every [unfilled] day(s). [In Bassinet/Crib] : sleeps in bassinet/crib [On back] : sleeps on back [Co-sleeping] : no co-sleeping [Pacifier use] : not using pacifier [No] : No cigarette smoke exposure [Exposure to electronic nicotine delivery system] : No exposure to electronic nicotine delivery system [Water heater temperature set at <120 degrees F] : Water heater temperature set at <120 degrees F [Rear facing car seat in back seat] : Rear facing car seat in back seat [Carbon Monoxide Detectors] : Carbon monoxide detectors at home [Smoke Detectors] : Smoke detectors at home. [At risk for exposure to TB] : Not at risk for exposure to Tuberculosis  [NO] : No

## 2025-04-30 NOTE — PHYSICAL EXAM
[NAD] : in no acute distress [Alert and Active] : alert and active [PERRL] : pupils were equal, round, reactive to light  [icteric] : anicteric [Moist & Pink Mucous Membranes] : moist and pink mucous membranes [CTAB] : lungs clear to auscultation bilaterally [Respiratory Distress] : no respiratory distress  [Wheeze] : no wheezing  [Regular Rate and Rhythm] : regular rate and rhythm [Normal S1, S2] : normal S1 and S2 [Murmur] : no murmur [Soft] : soft  [Distended] : non distended [Tender] : non tender [Normal Bowel Sounds] : normal bowel sounds [Stool Palpable] : no stool palpable [Mass ___ cm] : no masses were palpated [No HSM] : no hepatosplenomegaly appreciated [Normal Position] : normal position [Lymphadenopathy] : no lymphadenopathy  [Well-Perfused] : well-perfused [Edema] : no edema [Cyanosis] : no cyanosis [Rash] : no rash [Jaundice] : no jaundice [Appropriate Behavior] : appropriate behavior [FreeTextEntry1] : +small for age, otherwise well-appearing

## 2025-04-30 NOTE — ASSESSMENT
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 His feeding volume and emesis seemed to get worse after tongue tie release on 3/21, however he has not had any forceful emesis for past 2 days without intervention. Overall, inadequate intake is the likely issue as he is only taking 30-45ml per feed of 27kcal Neosure/EHM, and it seems to be due to poor feeding skills.  At end of visit parents fed him, and he seemed disinterested, formula pooled in mouth before he eventually swallowed, and he was shaking his head back and forth at times. Referred for SLP evaluation, and contacted them directly to try to get him seen soon; advised parents to ask EI for feeding eval if [facility] not able to get him scheduled soon.   Follow-up in 2 weeks, and parents instructed to call our office in 1 week to check in about how he is doing.   PLAN -Speech and swallow evaluation  -Famotidine q12h if he starts to have emesis again after visit , prescription sent in so it is available , For now continue 27-calorie fortified formula -Will consider Fortini 30kcal formula if he is continuing to have inadequate intake at 1 week phone call check-in  - Family to call in 1 week with an update and we will have him start Fortini if he is not improving. Family to call if any questions or concerns and follow-up in 2 weeks

## 2025-04-30 NOTE — HISTORY OF PRESENT ILLNESS
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  I reviewed the notes in the EMR from the pediatrician, NICU, genetics.  Parents report currently he is taking Neosure 27kcal 30-45ml q3h including overnight, uses Dr. Garcia's 1 nipple with a green Dr. Brown anti-colic modifier piece in bottle. Formula is mixed by measuring 45-50ml water first then adding 1 scoop formula. It takes him about 30min to feed, and he falls asleep about 50% of the time and has to be woken to finish. Mom pumps EHM also, fortifies with 1 teaspoon of Neosure powder per 45-50ml EHM; estimates 2-3 EHM feeds per day. He does not always finish feeds, minimum he takes is 25ml but that is rare; usually takes 30-40ml more consistently, with EHM he is more likely to take 45ml. Mom always feeds him at home, no other family members feed him. He stools once every 2-3 days, soft, dark green, no blood or mucous. Voids 6+ times per day.   Prior to tongue tie release on 3/21, he was taking 45ml and was sucking hard, but they think he was straining. Right after surgery, they noticed he had a decrease in volume, but he does seem to be working less hard to feed in terms of sucking less hard. This is also when he began to have daily formula-colored emesis 1-2 times per day. He spits up with most feeds, but parents report forceful vomiting 1-2/day got more consistent after surgery. However, he has gone 2 days without the vomiting so far without intervention. He vomits more with formula than EHM. Parents feel this started around 3 weeks ago that he started vomiting daily. Mom holds him upright for 10 minutes minimum and always burps him after feeds. Parents deny fevers, cough, runny nose, or inconsolable fussiness. No cyanosis, sweating, or tachypnea with feeds.  No coughing or choking with feeds  BHx: Born 33 weeks via CS for NRFHT, 2 vessel umbilicus. At 2mo transferred to NICU for CPAP, noted to have triangular face, genetics sent microarray confirming Nasim Silver syndrome (a/w growth deficiency particularly weight, hypoglycemia, developmental delays, micrognathia,  abnormalities). HUS w/ germinal matrix hemorrhage. MAURY w/ small R kidney. Normal NBS.  PMH: R cryptorchidism, incomplete foreskin PSH: Tongue tie release 3/21/25 (plans for R inguinal hernia repair also in May)  Meds: MVS + Fe, Mylicon PRN  All: NKDA  FMH: Denies any conditions in immediate family members  He has been doing well, he threw up 2x over the last. H eis doing a little more with the new bottles, taking 45ml but sometimes a little less. Won;t take 50ml most of the time. Spitting p has been much.   He is on similac neosure and breastmilk , more the neosure.     He is stooling every 4 days.  Stool is soft. There is no blood or mucous in the stool. When he does not stool he has a lot of gas.

## 2025-04-30 NOTE — REASON FOR VISIT
[Consultation Follow Up] : a consultation follow up  [Mother] : mother [Father] : father [Language Line ] : provided by Language Line   [Interpreters_IDNumber] : [id number] [Interpreters_FullName] : Chandrakant [TWNoteComboBox1] : British

## 2025-05-21 NOTE — PHYSICAL EXAM
Right arm; [General Appearance - Alert] : alert [General Appearance - In No Acute Distress] : in no acute distress [General Appearance - Well Developed] : well developed [General Appearance - Well-Appearing] : well appearing [Appearance Of Head] : the head was normocephalic [Sclera] : the conjunctiva were normal [Outer Ear] : the ears and nose were normal in appearance [Examination Of The Oral Cavity] : mucous membranes were moist and pink [Auscultation Breath Sounds / Voice Sounds] : breath sounds clear to auscultation bilaterally [Normal Chest Appearance] : the chest was normal in appearance [Apical Impulse] : quiet precordium with normal apical impulse [Heart Rate And Rhythm] : normal heart rate and rhythm [Heart Sounds] : normal S1 and S2 [No Murmur] : no murmurs  [Heart Sounds Gallop] : no gallops [Heart Sounds Pericardial Friction Rub] : no pericardial rub [Edema] : no edema [Arterial Pulses] : normal upper and lower extremity pulses with no pulse delay [Heart Sounds Click] : no clicks [Capillary Refill Test] : normal capillary refill [Bowel Sounds] : normal bowel sounds [Abdomen Soft] : soft [Nondistended] : nondistended [Abdomen Tenderness] : non-tender [Nail Clubbing] : no clubbing  or cyanosis of the fingers [Motor Tone] : normal muscle strength and tone [Cervical Lymph Nodes Enlarged Anterior] : The anterior cervical nodes were normal [Cervical Lymph Nodes Enlarged Posterior] : The posterior cervical nodes were normal [] : no rash [Skin Lesions] : no lesions [Skin Turgor] : normal turgor [Demonstrated Behavior - Infant Nonreactive To Parents] : interactive [Mood] : mood and affect were appropriate for age [Demonstrated Behavior] : normal behavior

## 2025-05-21 NOTE — PHYSICAL EXAM
[General Appearance - Alert] : alert [General Appearance - In No Acute Distress] : in no acute distress [General Appearance - Well Developed] : well developed [General Appearance - Well-Appearing] : well appearing [Appearance Of Head] : the head was normocephalic [Sclera] : the conjunctiva were normal [Outer Ear] : the ears and nose were normal in appearance [Examination Of The Oral Cavity] : mucous membranes were moist and pink [Auscultation Breath Sounds / Voice Sounds] : breath sounds clear to auscultation bilaterally [Normal Chest Appearance] : the chest was normal in appearance [Apical Impulse] : quiet precordium with normal apical impulse [Heart Rate And Rhythm] : normal heart rate and rhythm [Heart Sounds] : normal S1 and S2 [No Murmur] : no murmurs  [Heart Sounds Gallop] : no gallops [Heart Sounds Pericardial Friction Rub] : no pericardial rub [Edema] : no edema [Arterial Pulses] : normal upper and lower extremity pulses with no pulse delay [Heart Sounds Click] : no clicks [Capillary Refill Test] : normal capillary refill [Bowel Sounds] : normal bowel sounds [Abdomen Soft] : soft [Nondistended] : nondistended [Abdomen Tenderness] : non-tender [Nail Clubbing] : no clubbing  or cyanosis of the fingers [Motor Tone] : normal muscle strength and tone [Cervical Lymph Nodes Enlarged Anterior] : The anterior cervical nodes were normal [Cervical Lymph Nodes Enlarged Posterior] : The posterior cervical nodes were normal [] : no rash [Skin Lesions] : no lesions [Skin Turgor] : normal turgor [Demonstrated Behavior - Infant Nonreactive To Parents] : interactive [Mood] : mood and affect were appropriate for age [Demonstrated Behavior] : normal behavior

## 2025-05-22 NOTE — REVIEW OF SYSTEMS
[] :  [___ Formula] : [unfilled] Formula  [___ ounces/feeding] : ~PETE noyola/feeding [___ Times/day] : [unfilled] times/day [Being A Poor Eater] : poor eater [Nl] : no feeding issues at this time. [Refusal to Bear Wgt] : normal weight bearing [Failure To Thrive] : no failure to thrive [Penis Circumcised] : not circumcised [Undescended Testes] : no undescended testicle [Ambiguous Genitals] : genitals not ambiguous [Acting Fussy] : not acting ~L fussy [Fever] : no fever [Wgt Loss (___ Lbs)] : no recent weight loss [Pallor] : not pale [Discharge] : no discharge [Redness] : no redness [Nasal Discharge] : no nasal discharge [Nasal Stuffiness] : no nasal congestion [Stridor] : no stridor [Cyanosis] : no cyanosis [Edema] : no edema [Diaphoresis] : not diaphoretic [Tachypnea] : not tachypneic [Wheezing] : no wheezing [Cough] : no cough [Vomiting] : no vomiting [Diarrhea] : no diarrhea [Decrease In Appetite] : appetite not decreased [Fainting (Syncope)] : no fainting [Dec Consciousness] :  no decrease in consciousness [Seizure] : no seizures [Hypotonicity (Flaccid)] : not hypotonic [Puffy Hands/Feet] : no hand/feet puffiness [Rash] : no rash [Hemangioma] : no hemangioma [Jaundice] : no jaundice [Wound problems] : no wound problems [Bruising] : no tendency for easy bruising [Swollen Glands] : no lymphadenopathy [Enlarged Ovid] : the fontanelle was not enlarged [Hoarse Cry] : no hoarse cry [Dec Urine Output] : no oliguria

## 2025-05-22 NOTE — CONSULT LETTER
[Today's Date] : [unfilled] [Name] : Name: [unfilled] [] : : ~~ [Today's Date:] : [unfilled] [Dear  ___:] : Dear Dr. [unfilled]: [Consult - Single Provider] : Thank you very much for allowing me to participate in the care of this patient. If you have any questions, please do not hesitate to contact me. [Sincerely,] : Sincerely, [FreeTextEntry4] : Mari Justice MD [FreeTextEntry5] :  [address] [facility] [address], [place] [address] [FreeTextEntry6] : [phone]

## 2025-05-22 NOTE — REASON FOR VISIT
[Initial Consultation] : an initial consultation for [Parents] : parents [FreeTextEntry3] : Screening for Cardiovascular condition  [Interpreters_IDNumber] : [id number] [Interpreters_FullName] : MADDISON [TWNoteComboBox1] : Iranian

## 2025-05-22 NOTE — REVIEW OF SYSTEMS
[] :  [___ Formula] : [unfilled] Formula  [___ ounces/feeding] : ~PETE noyola/feeding [___ Times/day] : [unfilled] times/day [Being A Poor Eater] : poor eater [Nl] : no feeding issues at this time. [Refusal to Bear Wgt] : normal weight bearing [Failure To Thrive] : no failure to thrive [Penis Circumcised] : not circumcised [Undescended Testes] : no undescended testicle [Ambiguous Genitals] : genitals not ambiguous [Acting Fussy] : not acting ~L fussy [Fever] : no fever [Wgt Loss (___ Lbs)] : no recent weight loss [Pallor] : not pale [Discharge] : no discharge [Redness] : no redness [Nasal Discharge] : no nasal discharge [Nasal Stuffiness] : no nasal congestion [Stridor] : no stridor [Cyanosis] : no cyanosis [Edema] : no edema [Diaphoresis] : not diaphoretic [Tachypnea] : not tachypneic [Wheezing] : no wheezing [Cough] : no cough [Vomiting] : no vomiting [Diarrhea] : no diarrhea [Decrease In Appetite] : appetite not decreased [Fainting (Syncope)] : no fainting [Dec Consciousness] :  no decrease in consciousness [Seizure] : no seizures [Hypotonicity (Flaccid)] : not hypotonic [Puffy Hands/Feet] : no hand/feet puffiness [Rash] : no rash [Hemangioma] : no hemangioma [Jaundice] : no jaundice [Wound problems] : no wound problems [Bruising] : no tendency for easy bruising [Swollen Glands] : no lymphadenopathy [Enlarged [place]] : the fontanelle was not enlarged [Hoarse Cry] : no hoarse cry [Dec Urine Output] : no oliguria

## 2025-05-22 NOTE — CONSULT LETTER
[Today's Date] : [unfilled] [Name] : Name: [unfilled] [] : : ~~ [Today's Date:] : [unfilled] [Dear  ___:] : Dear Dr. [unfilled]: [Consult - Single Provider] : Thank you very much for allowing me to participate in the care of this patient. If you have any questions, please do not hesitate to contact me. [Sincerely,] : Sincerely, [FreeTextEntry4] : Mari Justice MD [FreeTextEntry5] :  [address], [place] [address] [FreeTextEntry6] : [phone]

## 2025-05-22 NOTE — CARDIOLOGY SUMMARY
[Today's Date] : [unfilled] [FreeTextEntry1] : Normal sinus rhythm.  Normal ECG. [FreeTextEntry2] : 1. Technically difficult study due to patient crying. 2. {S,D,S\} Situs solitus, D-ventricular looping, normally related great arteries. 3. Normal left ventricular morphology. 4. Low normal left ventricular systolic function. 5. Normal right ventricular morphology with qualitatively normal size and systolic function. 6. Possible trivial mid muscular ventricular septal defect (vs. artifact). 7. No patent ductus arteriosus. 8. No pericardial effusion. 9. High takeoff of the right coronary artery that still arises above the right coronary sinus of Valsalva, normal variant. Will re-evaluate during the next visit.

## 2025-05-22 NOTE — REASON FOR VISIT
[Initial Consultation] : an initial consultation for [Parents] : parents [FreeTextEntry3] : Screening for Cardiovascular condition  [Interpreters_IDNumber] : [id number] [Interpreters_FullName] : MADDISON [TWNoteComboBox1] : Italian

## 2025-05-22 NOTE — CLINICAL NARRATIVE
[Up to Date] : Up to Date [FreeTextEntry2] : Dc weight on 2/24/25 :1950g  Todays weight: 3170g  Patient is gaining 14.1g/day since discharge on 2/24/25.

## 2025-05-28 NOTE — DISCUSSION/SUMMARY
[GA at Birth: ___] : GA at Birth: [unfilled] [Chronological Age: ___] : Chronological Age: [unfilled] [Corrected Age: ___] : Corrected Age: [unfilled] [Alert] : alert [] : axial tone normal [Turns head to both sides (0-2 months)] : turns head to both sides (0-2 months) [Moves extremities equally] : moves extremities equally [Moves against gravity] : moves against gravity [Hands to midline (0-3 months)] : hands to midline (0-3 months) [Turns head side to side] : turns head side to side [Lifts head (45 deg 0-2 mon, 90 deg 1-3 mon)] : lifts head (45 degrees 0-2 months, 90 degrees 1-3 months) [Active] : sidelying to supine (1.5 - 2 months) - Active [Passive] : prone to supine (2- 5 months) - Passive [Lag] : Head lag (0-2 months) - lag [Poor] : head control is poor [Gross Grasp] : gross grasp [Release] : release [<] : < [Focusing (2 months)] : focusing (2 months) [Tracking (2 months)] : tracking (2 months) [Prone] : prone [Sitting] : sitting [Rolling] : rolling [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  Family educated in Icelandic developmental positioning packet level II with good understanding. Family educated in activities to improve head shaping including increasing play in prone & sitting position. Infant had EI evaluation however family states they haven't heard the results. Discussed with REINA Calderon.

## 2025-05-28 NOTE — DISCUSSION/SUMMARY
[GA at Birth: ___] : GA at Birth: [unfilled] [Chronological Age: ___] : Chronological Age: [unfilled] [Corrected Age: ___] : Corrected Age: [unfilled] [Alert] : alert [] : axial tone normal [Turns head to both sides (0-2 months)] : turns head to both sides (0-2 months) [Moves extremities equally] : moves extremities equally [Moves against gravity] : moves against gravity [Hands to midline (0-3 months)] : hands to midline (0-3 months) [Turns head side to side] : turns head side to side [Lifts head (45 deg 0-2 mon, 90 deg 1-3 mon)] : lifts head (45 degrees 0-2 months, 90 degrees 1-3 months) [Active] : sidelying to supine (1.5 - 2 months) - Active [Passive] : prone to supine (2- 5 months) - Passive [Lag] : Head lag (0-2 months) - lag [Poor] : head control is poor [Gross Grasp] : gross grasp [Release] : release [<] : < [Focusing (2 months)] : focusing (2 months) [Tracking (2 months)] : tracking (2 months) [Prone] : prone [Sitting] : sitting [Rolling] : rolling [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  Family educated in Trinidadian developmental positioning packet level II with good understanding. Family educated in activities to improve head shaping including increasing play in prone & sitting position. Infant had EI evaluation however family states they haven't heard the results. Discussed with REINA Calderon.

## 2025-05-28 NOTE — DISCUSSION/SUMMARY
[GA at Birth: ___] : GA at Birth: [unfilled] [Chronological Age: ___] : Chronological Age: [unfilled] [Corrected Age: ___] : Corrected Age: [unfilled] [Alert] : alert [] : axial tone normal [Turns head to both sides (0-2 months)] : turns head to both sides (0-2 months) [Moves extremities equally] : moves extremities equally [Moves against gravity] : moves against gravity [Hands to midline (0-3 months)] : hands to midline (0-3 months) [Turns head side to side] : turns head side to side [Lifts head (45 deg 0-2 mon, 90 deg 1-3 mon)] : lifts head (45 degrees 0-2 months, 90 degrees 1-3 months) [Active] : sidelying to supine (1.5 - 2 months) - Active [Passive] : prone to supine (2- 5 months) - Passive [Lag] : Head lag (0-2 months) - lag [Poor] : head control is poor [Gross Grasp] : gross grasp [Release] : release [<] : < [Focusing (2 months)] : focusing (2 months) [Tracking (2 months)] : tracking (2 months) [Prone] : prone [Sitting] : sitting [Rolling] : rolling [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  Family educated in Nigerien developmental positioning packet level II with good understanding. Family educated in activities to improve head shaping including increasing play in prone & sitting position. Infant had EI evaluation however family states they haven't heard the results. Discussed with REINA Calderon.

## 2025-05-29 NOTE — BIRTH HISTORY
[Birthweight ___ kg] : weight [unfilled] kg [Weight ___ kg] : weight [unfilled] kg [Length ___ cm] : length [unfilled] cm [Head Circumference ___ cm] : head circumference [unfilled] cm [Formula: ____] : formula: [unfilled] [de-identified] : Responded to urgent C/S for a non reassuring fetal heart tracing of a 34 weeks gestation baby boy. Mother is a 44 year old  with prenatal labs pending, blood type O pos. Pregnancy complicated by early onset fetal growth restriction, AEDV, GDMA1, and chromosomal abnormality of trisomy 7 Nasim Silver syndrome with fetal anomalies noted for absent nasal bone, Right pelvic kidney, echogenic bowel and single umbilical artery with a normal fetal echo. Mother presented to labor and delivery from office with new onset oligohydramnios and a non reassuring fetal heart tracing. AROM at delivery with clear fluid, infant emerged cephalic with good tone and weak cry. Infant placed in warmer on heated mattress, stimulated and suctioned, CPAP applied at 2 mins of life for retractions and duskiness, max settings peep 6 40% to achieve sats in target range for age. Infant transferred to NICU on CPAP 6 25% for further evaluation and management. Apgars 8/9. 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

## 2025-05-29 NOTE — PHYSICAL EXAM
[Pink] : pink [Well Perfused] : well perfused [No Rashes] : no rashes [No Birth Marks] : no birth marks [Conjunctiva Clear] : conjunctiva clear [PERRL] : pupils were equal, round, reactive to light  [Ears Normal Position and Shape] : normal position and shape of ears [Nares Patent] : nares patent [No Nasal Flaring] : no nasal flaring [Moist and Pink Mucous Membranes] : moist and pink mucous membranes [Palate Intact] : palate intact [No Torticollis] : no torticollis [No Neck Masses] : no neck masses [Symmetric Expansion] : symmetric chest expansion [No Retractions] : no retractions [Clear to Auscultation] : lungs clear to auscultation  [Normal S1, S2] : normal S1 and S2 [Regular Rhythm] : regular rhythm [No Murmur] : no mumur [Normal Pulses] : normal pulses [Non Distended] : non distended [No HSM] : no hepatosplenomegaly appreciated [No Masses] : no masses were palpated [Normal Bowel Sounds] : normal bowel sounds [No Umbilical Hernia] : no umbilical hernia [Normal Genitalia] : normal genitalia [No Sacral Dimples] : no sacral dimples [No Scoliosis] : no scoliosis [Normal Range of Motion] : normal range of motion [Normal Posture] : normal posture [No evidence of Hip Dislocation] : no evidence of hip dislocation [Active and Alert] : active and alert [Normal truncal tone] : normal truncal tone [Normal deep tendon reflexes] : normal deep tendon reflexes [Strong Suck] : the strong sucking reflex was ~L present [Fixes On Faces] : fixes on faces [Follows 180 Degrees] : visual track 180 degrees [Smiles Sociallly] : has a social smile [Laughs] : laughs [Crosby] : coos [Lifts Head And Chest 45 degress in Prone] : lifts the head and chest 45 degress in prone [Rolls Front to Back] : rolls front to back [Brings Hands to Midline] : brings hands to midline [Hands Open] : the hands are not open [Reaches for Objects] : does not reach for objects [de-identified] : (R) hernia incision healing,  [de-identified] : undecscended (R) testicle [de-identified] : (R) plagiocephaly [de-identified] : + head lag, UEs mild hypertonia, LEs mild hypotonia    [de-identified] : hands fisted but open with stim

## 2025-05-29 NOTE — ASSESSMENT
[FreeTextEntry1] : JUNIOR SANTIAGO is a 33.1 week gestation infant, now chronologic age 4 mos. 3 wks., corrected age 3 mos. seen in  follow-up. Pertinent NICU history includes Prematurity 33 wks, IUGR/SGA, IDM, Trisomy 7/Wu-Silver Syndrome, Respiratory failure due to RDS, PAULA, Oral Dysphagia, Undescended Testicles B/L.  The following issues were addressed at this visit.  Growth and nutrition: Weight gain has been  11 g/day and plots at the 1st percentile for corrected age.  Head growth and length are at the 1st and 1st percentiles respectively.  Baby is currently feeding 27 kobe Neosure but will only take a max 50 ml/feeding every 3 hrs.  The baby will continue on this formula until he sees GI.  The baby was due to see GI the end of May but no appointment has been scheduled.  Neonatology has reached out to Dr Dudley who has been following the baby and will see the patient on .  The GI office called the parents to update them of this appointment.  Due to prematurity, solid foods are not recommended until 5-6 months corrected age with good head control. No labs needed today. Continue vitamin supplements.  Development/neuro: baby has developmental delay for chronologic age, was seen by PT/OT today and given home exercises to do. 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 The baby currently received feeding therapy 2x/month. Early Intervention is recommended, and baby was evaluated ~ 1 week ago.  Parents are waiting for the results of the assessment to see if the baby qualifies. The baby will follow-up with pediatric developmental on 25. He also qualifies for a Minnie Assessment when he corrects to 1 year of age (2026)  Anemia: Baby has not been on iron supplements and is taking full formula feedings. Hct reviewed and is appropriate for age.  CLD: Infant has no sx of chronic lung disease. O2 sats 100% in RA.  CARDIO:  The baby follows w/ peds cardiology to screen for cardiovascular disorders related to Wu-Silver syndrome.  Last ECHO and EKG in May was reassuring but ncidental finding of low normal left ventricular systolic function and possible trivial ventricular septal defect (vs. artifact). The baby will follow with them in August.  PAULA: Baby has signs of PAULA and is followed by GI.  Continues on Famotidine 2x/day. The baby will follow up with GI as above.  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  Parents instructed to contact Peds Nephrology for follow up.  Dad will make this appointment.  ROP: Baby is at risk for ROP and other ophthalmologic complications due to prematurity and needs follow up with ophthalmology in August. Father will call to schedule this appointment. Parents informed of importance of ophtho follow-up.   Peds Surgery:  baby is s/p (R) hernia repair and is doing well.  no signs of infection.  Peds Surgery will continue to follow for undescended (R) teste and also circumcision in the future.  Next appointment is 25.   Other:   Health maintenance: Reviewed routine vaccination schedule with parent as well as guidance for flu vaccine for family, COVID-19 precautions, and need for PMD f/u.  Also discussed bathing and skin care recommendations.   Reviewed notes by Peds Cardio, Peds GI, Genetics, [facility] Speech, Hospital notes and Discharge Summary.  Baby is discharged from the NICU Grad Clinic.  He will have a Minnie Assessment at 1 year corrected age (2026).

## 2025-05-29 NOTE — PATIENT INSTRUCTIONS
[Verbal patient instructions provided] : Verbal patient instructions provided. [FreeTextEntry1] : Peds Developmental Clinic on 25 F/U Peds Endo 25 F/U Eye MD needs August appointment [phone] F/U Peds GI - needs appointment ASA  [phone] Feeding Therapy 25 F/U Peds Cardio 25 F/U Peds Surgery:  25 F/U Pw/ Peds Nephrology - call for an appointment      phone:  [phone] Discharged from the NICU Grad Clinic.   will call you to schedule the appointment for Minnie assessment for 2026. [FreeTextEntry2] : Evaluated by PT.  exercises and positioning reviewed and tummy time reinforced. [FreeTextEntry3] : in progress [FreeTextEntry4] : Continue 27 kobe Neosure until you see GI.  Give diluted prune/pear juice dilutred 1:1 w/ formula 1-2x/day [FreeTextEntry5] : PVS, Famotidine [FreeTextEntry6] : n/a [FreeTextEntry7] : n/a [FreeTextEntry8] : PMD to do [de-identified] : Aquaphor for dry skin, use unscented baby care products, avoid direct sunlight especially during the summermonths [de-identified] : n/a [de-identified] : n/a

## 2025-05-29 NOTE — PATIENT INSTRUCTIONS
[Verbal patient instructions provided] : Verbal patient instructions provided. [FreeTextEntry1] : Peds Developmental Clinic on 25 F/U Peds Endo 25 F/U Eye MD needs August appointment [phone] F/U Peds GI - needs appointment ASA  [phone] Feeding Therapy 25 F/U Peds Cardio 25 F/U Peds Surgery:  25 F/U Pw/ Peds Nephrology - call for an appointment      phone:  [phone] Discharged from the NICU Grad Clinic.   will call you to schedule the appointment for Minnie assessment for 2026. [FreeTextEntry2] : Evaluated by PT.  exercises and positioning reviewed and tummy time reinforced. [FreeTextEntry3] : in progress [FreeTextEntry4] : Continue 27 kobe Neosure until you see GI.  Give diluted prune/pear juice dilutred 1:1 w/ formula 1-2x/day [FreeTextEntry5] : PVS, Famotidine [FreeTextEntry6] : n/a [FreeTextEntry7] : n/a [FreeTextEntry8] : PMD to do [de-identified] : Aquaphor for dry skin, use unscented baby care products, avoid direct sunlight especially during the summermonths [de-identified] : n/a [de-identified] : n/a

## 2025-05-29 NOTE — CONSULT LETTER
[Dear  ___] : Dear  [unfilled], [Consult Letter:] : I had the pleasure of evaluating your patient, [unfilled]. [Courtesy Letter:] : I had the pleasure of seeing your patient, [unfilled], in my office today. [Please see my note below.] : Please see my note below. [Sincerely,] : Sincerely, [FreeTextEntry3] : Bisi Del Castillo MD Attending Neonatologist

## 2025-05-29 NOTE — HISTORY OF PRESENT ILLNESS
[EDC: ___] : EDC: [unfilled] [Gestational Age: ___] : Gestational Age: [unfilled] [Chronological Age: ___] : Chronological Age: [unfilled] [Corrected Age: ___] : Corrected Age: [unfilled] [Date of D/C: ___] : Date of D/C: [unfilled] [Cardiology: ___] : Cardiology: [unfilled] [Gastroenterology: ___] : Gastroenterology: [unfilled] [Developmental Pediatrics: ___] : Developmental Pediatrics: [unfilled] [Ophthalmology: ___] : Ophthalmology: [unfilled] [Pediatric Surgery: ___] : Pediatric Surgery: [unfilled] [Car seat use according to directions] : car seat used according to directions [Weight Gain Since Last Visit (oz/days) ___] : weight gain since last visit: [unfilled] (oz/days)  [___Formula] : [unfilled] [___ ounces/feeding] : ~PETE noyola/feeding [Every ___ hours] : every [unfilled] hours [_____ Times Per] : Stool frequency occurs [unfilled] times per  [Hard] : hard [Moderate amount] : moderate  [Solid Foods] : no solid food at this time [Bloody] : not bloody [Mucousy] : no mucous [de-identified] : D/C DIANNA [de-identified] : NRE 8  EI rec [de-identified] : Hernia surgery 5/23/25 [de-identified] :  Speech & Feedin/11    Peds Endo:  25    Needs  Nephrology appt  [phone] [de-identified] : only takes 50 ml max [FreeTextEntry4] : every 4-5 days [de-identified] : pellets [de-identified] : Placed supine in own bed.  Usually wakes to feed every 3 hrs.  Discussed w/ mom not to let the baby go past 4 hrs at night [de-identified] : n/a

## 2025-05-29 NOTE — HISTORY OF PRESENT ILLNESS
[EDC: ___] : EDC: [unfilled] [Gestational Age: ___] : Gestational Age: [unfilled] [Chronological Age: ___] : Chronological Age: [unfilled] [Corrected Age: ___] : Corrected Age: [unfilled] [Date of D/C: ___] : Date of D/C: [unfilled] [Cardiology: ___] : Cardiology: [unfilled] [Gastroenterology: ___] : Gastroenterology: [unfilled] [Developmental Pediatrics: ___] : Developmental Pediatrics: [unfilled] [Ophthalmology: ___] : Ophthalmology: [unfilled] [Pediatric Surgery: ___] : Pediatric Surgery: [unfilled] [Car seat use according to directions] : car seat used according to directions [Weight Gain Since Last Visit (oz/days) ___] : weight gain since last visit: [unfilled] (oz/days)  [___Formula] : [unfilled] [___ ounces/feeding] : ~PETE noyola/feeding [Every ___ hours] : every [unfilled] hours [_____ Times Per] : Stool frequency occurs [unfilled] times per  [Hard] : hard [Moderate amount] : moderate  [Solid Foods] : no solid food at this time [Bloody] : not bloody [Mucousy] : no mucous [de-identified] : D/C DIANNA [de-identified] : NRE 8  EI rec [de-identified] :  Speech & Feedin/11    Peds Endo:  25    Needs  Nephrology appt  [phone] [de-identified] : Hernia surgery 5/23/25 [de-identified] : only takes 50 ml max [FreeTextEntry4] : every 4-5 days [de-identified] : pellets [de-identified] : Placed supine in own bed.  Usually wakes to feed every 3 hrs.  Discussed w/ mom not to let the baby go past 4 hrs at night [de-identified] : n/a

## 2025-05-29 NOTE — BIRTH HISTORY
[Birthweight ___ kg] : weight [unfilled] kg [Weight ___ kg] : weight [unfilled] kg [Length ___ cm] : length [unfilled] cm [Head Circumference ___ cm] : head circumference [unfilled] cm [Formula: ____] : formula: [unfilled] [de-identified] : Responded to urgent C/S for a non reassuring fetal heart tracing of a 34 weeks gestation baby boy. Mother is a 44 year old  with prenatal labs pending, blood type O pos. Pregnancy complicated by early onset fetal growth restriction, AEDV, GDMA1, and chromosomal abnormality of trisomy 7 Nasim Silver syndrome with fetal anomalies noted for absent nasal bone, Right pelvic kidney, echogenic bowel and single umbilical artery with a normal fetal echo. Mother presented to labor and delivery from office with new onset oligohydramnios and a non reassuring fetal heart tracing. AROM at delivery with clear fluid, infant emerged cephalic with good tone and weak cry. Infant placed in warmer on heated mattress, stimulated and suctioned, CPAP applied at 2 mins of life for retractions and duskiness, max settings peep 6 40% to achieve sats in target range for age. Infant transferred to NICU on CPAP 6 25% for further evaluation and management. Apgars 8/9. 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

## 2025-05-29 NOTE — REASON FOR VISIT
[Mother] : mother [Medical Records] : medical records [Follow-Up] : a follow-up visit for [Weight Check] : weight check [Developmental Delay] : developmental delay [[employer] ] : provided by [employer]   [Interpreters_IDNumber] : [id number] [Interpreters_FullName] : Zev [Parents] : parents [[employer] ] : provided by [employer]   [FreeTextEntry1] : [id number] [FreeTextEntry2] : Zev [TWNoteComboBox1] : Eritrean

## 2025-05-29 NOTE — REVIEW OF SYSTEMS
[Nl] : Allergy/Immunology [Immunizations are up to date] : Immunizations are up to date [FreeTextEntry1] : (R) teste undescended [de-identified] : (R) plagiocephaly

## 2025-05-29 NOTE — PHYSICAL EXAM
[Pink] : pink [Well Perfused] : well perfused [No Rashes] : no rashes [No Birth Marks] : no birth marks [Conjunctiva Clear] : conjunctiva clear [PERRL] : pupils were equal, round, reactive to light  [Ears Normal Position and Shape] : normal position and shape of ears [Nares Patent] : nares patent [No Nasal Flaring] : no nasal flaring [Moist and Pink Mucous Membranes] : moist and pink mucous membranes [Palate Intact] : palate intact [No Torticollis] : no torticollis [No Neck Masses] : no neck masses [Symmetric Expansion] : symmetric chest expansion [No Retractions] : no retractions [Clear to Auscultation] : lungs clear to auscultation  [Normal S1, S2] : normal S1 and S2 [Regular Rhythm] : regular rhythm [No Murmur] : no mumur [Normal Pulses] : normal pulses [Non Distended] : non distended [No HSM] : no hepatosplenomegaly appreciated [No Masses] : no masses were palpated [Normal Bowel Sounds] : normal bowel sounds [No Umbilical Hernia] : no umbilical hernia [Normal Genitalia] : normal genitalia [No Sacral Dimples] : no sacral dimples [No Scoliosis] : no scoliosis [Normal Range of Motion] : normal range of motion [Normal Posture] : normal posture [No evidence of Hip Dislocation] : no evidence of hip dislocation [Active and Alert] : active and alert [Normal truncal tone] : normal truncal tone [Normal deep tendon reflexes] : normal deep tendon reflexes [Strong Suck] : the strong sucking reflex was ~L present [Fixes On Faces] : fixes on faces [Follows 180 Degrees] : visual track 180 degrees [Smiles Sociallly] : has a social smile [Laughs] : laughs [[place]] : coos [Lifts Head And Chest 45 degress in Prone] : lifts the head and chest 45 degress in prone [Rolls Front to Back] : rolls front to back [Brings Hands to Midline] : brings hands to midline [Hands Open] : the hands are not open [Reaches for Objects] : does not reach for objects [de-identified] : (R) hernia incision healing,  [de-identified] : undecscended (R) testicle [de-identified] : (R) plagiocephaly [de-identified] : + head lag, UEs mild hypertonia, LEs mild hypotonia    [de-identified] : hands fisted but open with stim

## 2025-05-29 NOTE — REVIEW OF SYSTEMS
[Nl] : Allergy/Immunology [Immunizations are up to date] : Immunizations are up to date [FreeTextEntry1] : (R) teste undescended [de-identified] : (R) plagiocephaly

## 2025-05-29 NOTE — REASON FOR VISIT
[Mother] : mother [Medical Records] : medical records [Follow-Up] : a follow-up visit for [Weight Check] : weight check [Developmental Delay] : developmental delay [Language [employer] ] : provided by [employer]   [Interpreters_IDNumber] : [id number] [Interpreters_FullName] : Zev [Parents] : parents [[employer] ] : provided by [employer]   [FreeTextEntry1] : [id number] [FreeTextEntry2] : Zev [TWNoteComboBox1] : Ivorian

## 2025-05-29 NOTE — BIRTH HISTORY
[Birthweight ___ kg] : weight [unfilled] kg [Weight ___ kg] : weight [unfilled] kg [Length ___ cm] : length [unfilled] cm [Head Circumference ___ cm] : head circumference [unfilled] cm [Formula: ____] : formula: [unfilled] [de-identified] : Responded to urgent C/S for a non reassuring fetal heart tracing of a 34 weeks gestation baby boy. Mother is a 44 year old  with prenatal labs pending, blood type O pos. Pregnancy complicated by early onset fetal growth restriction, AEDV, GDMA1, and chromosomal abnormality of trisomy 7 Nasim Silver syndrome with fetal anomalies noted for absent nasal bone, Right pelvic kidney, echogenic bowel and single umbilical artery with a normal fetal echo. Mother presented to labor and delivery from office with new onset oligohydramnios and a non reassuring fetal heart tracing. AROM at delivery with clear fluid, infant emerged cephalic with good tone and weak cry. Infant placed in warmer on heated mattress, stimulated and suctioned, CPAP applied at 2 mins of life for retractions and duskiness, max settings peep 6 40% to achieve sats in target range for age. Infant transferred to NICU on CPAP 6 25% for further evaluation and management. Apgars 8/9. 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

## 2025-05-29 NOTE — REASON FOR VISIT
[Mother] : mother [Medical Records] : medical records [Follow-Up] : a follow-up visit for [Weight Check] : weight check [Developmental Delay] : developmental delay [Language [employer] ] : provided by [employer]   [Interpreters_IDNumber] : [id number] [Interpreters_FullName] : Zev [Parents] : parents [[employer] ] : provided by [employer]   [FreeTextEntry1] : [id number] [FreeTextEntry2] : Zev [TWNoteComboBox1] : Malawian

## 2025-05-29 NOTE — REVIEW OF SYSTEMS
[Nl] : Allergy/Immunology [Immunizations are up to date] : Immunizations are up to date [FreeTextEntry1] : (R) teste undescended [de-identified] : (R) plagiocephaly

## 2025-05-29 NOTE — HISTORY OF PRESENT ILLNESS
[EDC: ___] : EDC: [unfilled] [Gestational Age: ___] : Gestational Age: [unfilled] [Chronological Age: ___] : Chronological Age: [unfilled] [Corrected Age: ___] : Corrected Age: [unfilled] [Date of D/C: ___] : Date of D/C: [unfilled] [Cardiology: ___] : Cardiology: [unfilled] [Gastroenterology: ___] : Gastroenterology: [unfilled] [Developmental Pediatrics: ___] : Developmental Pediatrics: [unfilled] [Ophthalmology: ___] : Ophthalmology: [unfilled] [Pediatric Surgery: ___] : Pediatric Surgery: [unfilled] [Car seat use according to directions] : car seat used according to directions [Weight Gain Since Last Visit (oz/days) ___] : weight gain since last visit: [unfilled] (oz/days)  [___Formula] : [unfilled] [___ ounces/feeding] : ~PETE noyola/feeding [Every ___ hours] : every [unfilled] hours [_____ Times Per] : Stool frequency occurs [unfilled] times per  [Hard] : hard [Moderate amount] : moderate  [Solid Foods] : no solid food at this time [Bloody] : not bloody [Mucousy] : no mucous [de-identified] : D/C DIANNA [de-identified] : NRE 8  EI rec [de-identified] : Hernia surgery 5/23/25 [de-identified] :  Speech & Feedin/11    Peds Endo:  25    Needs  Nephrology appt  [phone] [de-identified] : only takes 50 ml max [FreeTextEntry4] : every 4-5 days [de-identified] : pellets [de-identified] : Placed supine in own bed.  Usually wakes to feed every 3 hrs.  Discussed w/ mom not to let the baby go past 4 hrs at night [de-identified] : n/a

## 2025-05-29 NOTE — PHYSICAL EXAM
[Pink] : pink [Well Perfused] : well perfused [No Rashes] : no rashes [No Birth Marks] : no birth marks [Conjunctiva Clear] : conjunctiva clear [PERRL] : pupils were equal, round, reactive to light  [Ears Normal Position and Shape] : normal position and shape of ears [Nares Patent] : nares patent [No Nasal Flaring] : no nasal flaring [Moist and Pink Mucous Membranes] : moist and pink mucous membranes [Palate Intact] : palate intact [No Torticollis] : no torticollis [No Neck Masses] : no neck masses [Symmetric Expansion] : symmetric chest expansion [No Retractions] : no retractions [Clear to Auscultation] : lungs clear to auscultation  [Normal S1, S2] : normal S1 and S2 [Regular Rhythm] : regular rhythm [No Murmur] : no mumur [Normal Pulses] : normal pulses [Non Distended] : non distended [No HSM] : no hepatosplenomegaly appreciated [No Masses] : no masses were palpated [Normal Bowel Sounds] : normal bowel sounds [No Umbilical Hernia] : no umbilical hernia [Normal Genitalia] : normal genitalia [No Sacral Dimples] : no sacral dimples [No Scoliosis] : no scoliosis [Normal Range of Motion] : normal range of motion [Normal Posture] : normal posture [No evidence of Hip Dislocation] : no evidence of hip dislocation [Active and Alert] : active and alert [Normal truncal tone] : normal truncal tone [Normal deep tendon reflexes] : normal deep tendon reflexes [Strong Suck] : the strong sucking reflex was ~L present [Fixes On Faces] : fixes on faces [Follows 180 Degrees] : visual track 180 degrees [Smiles Sociallly] : has a social smile [Laughs] : laughs [[place]] : coos [Lifts Head And Chest 45 degress in Prone] : lifts the head and chest 45 degress in prone [Rolls Front to Back] : rolls front to back [Brings Hands to Midline] : brings hands to midline [Hands Open] : the hands are not open [Reaches for Objects] : does not reach for objects [de-identified] : (R) hernia incision healing,  [de-identified] : undecscended (R) testicle [de-identified] : (R) plagiocephaly [de-identified] : + head lag, UEs mild hypertonia, LEs mild hypotonia    [de-identified] : hands fisted but open with stim

## 2025-06-09 NOTE — HISTORY OF PRESENT ILLNESS
[Well-balanced] : well-balanced [Formula ___ oz/feed] : [unfilled] oz of formula per feed [Hours between feeds ___] : Child is fed every [unfilled] hours [Vitamins ___] : Patient takes [unfilled] vitamins daily [Normal] : Normal [Frequency of stools: ___] : Frequency of stools: [unfilled]  stools [___ voids per day] : [unfilled] voids per day [every ___ day(s)] : every [unfilled] day(s). [In Bassinet/Crib] : sleeps in bassinet/crib [On back] : sleeps on back [Sleeps 12-16 hours per 24 hours (including naps)] : sleeps 12-16 hours per 24 hours (including naps) [Pacifier use] : Pacifier use [Tummy time] : tummy time [No] : No cigarette smoke exposure [Water heater temperature set at <120 degrees F] : Water heater temperature set at <120 degrees F [Rear facing car seat in back seat] : Rear facing car seat in back seat [Carbon Monoxide Detectors] : Carbon monoxide detectors at home [YES] : Yes [Smoke Detectors] : Smoke detectors at home. [Co-sleeping] : no co-sleeping [Loose bedding, pillow, toys, and/or bumpers in crib] : no loose bedding, pillow, toys, and/or bumpers in crib [Screen time only for video chatting] : screen time not just for video chatting [Exposure to electronic nicotine delivery system] : No exposure to electronic nicotine delivery system

## 2025-06-09 NOTE — DISCUSSION/SUMMARY
[No Elimination Concerns] : elimination [Continue Regimen] : feeding [No Skin Concerns] : skin [Normal Sleep Pattern] : sleep [ Infant] :  infant [Poor Weight Gain] : poor weight gain [Delayed Fine Motor Skills] : delayed fine motor skills [Poor Length Gain] : poor length gain [Delayed Gross Motor Skills] : delayed gross motor skills [Anticipatory Guidance Given] : Anticipatory guidance addressed as per the history of present illness section [Chromosomal Anomalies ___] : [unfilled] [PCV] : pneumococcal conjugate vaccine [Rotavirus] : rotavirus [Parent/Guardian] : Parent/Guardian [No Medication Changes] : No medication changes at this time [] : The components of the vaccine(s) to be administered today are listed in the plan of care. The disease(s) for which the vaccine(s) are intended to prevent and the risks have been discussed with the caretaker.  The risks are also included in the appropriate vaccination information statements which have been provided to the patient's caregiver.  The caregiver has given consent to vaccinate. [FreeTextEntry1] : Recommend breastfeeding, 8-12 feedings per day. Mother should continue prenatal vitamins and avoid alcohol. If formula is needed, recommend iron-fortified formulations, 2-4 oz every 3-4 hrs. Cereal may be introduced using a spoon and bowl. When in car, patient should be in rear-facing car seat in back seat. Put baby to sleep on back, in own crib with no loose or soft bedding. Lower crib mattress. Help baby to maintain sleep and feeding routines. May offer pacifier if needed. Continue tummy time when awake.  Info regarding EI/.WIC and medicaid waver given.

## 2025-06-09 NOTE — DEVELOPMENTAL MILESTONES
[Yes: _______] : yes, [unfilled] [Laughs aloud] : laughs aloud [Vocalizes with extending cooing] : vocalizes with extending cooing [Turns to voice] : turns to voice [Keeps hands unfisted] : keeps hands unfisted [Passed] : passed [Plays with fingers in midline] : does not play with fingers in midline [Rolls over prone to supine] : does not roll over prone to supine [Grasps objects] : does not grasp objects

## 2025-06-09 NOTE — PHYSICAL EXAM
[Alert] : alert [Normocephalic] : normocephalic [Acute Distress] : no acute distress [Flat Open Anterior [place]] : flat open anterior fontanelle [Red Reflex] : red reflex bilateral [Normally Placed Ears] : normally placed ears [PERRL] : PERRL [Auricles Well Formed] : auricles well formed [Clear Tympanic membranes] : clear tympanic membranes [Light reflex present] : light reflex present [Bony landmarks visible] : bony landmarks visible [Nares Patent] : nares patent [Discharge] : no discharge [Palate Intact] : palate intact [Uvula Midline] : uvula midline [Palpable Masses] : no palpable masses [Clear to Auscultation Bilaterally] : clear to auscultation bilaterally [Symmetric Chest Rise] : symmetric chest rise [S1, S2 present] : S1, S2 present [Regular Rate and Rhythm] : regular rate and rhythm [Murmurs] : no murmurs [+2 Femoral Pulses] : (+) 2 femoral pulses [Soft] : soft [Tender] : nontender [Distended] : nondistended [Hepatomegaly] : no hepatomegaly [Bowel Sounds] : bowel sounds present [Splenomegaly] : no splenomegaly [Circumcised] : not circumcised [Central Urethral Opening] : central urethral opening [Patent] : patent [Normally Placed] : normally placed [Allis Sign] : negative Allis sign [No Abnormal Lymph Nodes Palpated] : no abnormal lymph nodes palpated [Castellon-Ortolani] : negative Castellon-Ortolani [Spinal Dimple] : no spinal dimple [Tuft of Hair] : no tuft of hair [Startle Reflex] : startle reflex present [Plantar Grasp] : plantar grasp reflex present [Symmetric Shaka] : asymmetric shaka present [Rash or Lesions] : no rash/lesions [de-identified] :  slightly decreased movement of right arm [de-identified] : right testicle palpable in upper scrotum

## 2025-06-13 NOTE — BIRTH HISTORY
[] : There were no problems passing meconium within 24 - 48 hrs of life [Age Appropriate] : age appropriate developmental milestones not met [FreeTextEntry6] : RDS was on oxygen
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
## 2025-06-14 NOTE — HISTORY OF PRESENT ILLNESS
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  I reviewed the notes in the EMR from the pediatrician, NICU, genetics.  BHx: Born 33 weeks via CS for NRFHT, 2 vessel umbilicus. At 2mo transferred to NICU for CPAP, noted to have triangular face, genetics sent microarray confirming Nasim Silver syndrome (a/w growth deficiency particularly weight, hypoglycemia, developmental delays, micrognathia,  abnormalities). HUS w/ germinal matrix hemorrhage. MAURY w/ small R kidney. Normal NBS.  He is here today for follow-up visit. Eating has improved a little and he is now eating a little more.  He is taking 50ml/60ml per feed and every 2-3 hrs. Eating overnight. ? 16oz.  He has been doing well, no spitting up.  He is eating in 25 min now then won't eat more. .He is on similac neosure  27 and breastmilk , more the neosure now, not as much breastmilk.   He is stooling every 4 days.  Stool is soft. There is no blood or mucous in the stool. When he does not stool he has a lot of gas. Once he had 70ml.  He is gaining weight though weight gain has been very slow

## 2025-06-14 NOTE — ASSESSMENT
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  He has been feeding since more volume recently and eating has improved somewhat.  Weight gain has been stable.  Weight gain is slow and steady.  Reflux has improved.  Recommend: -Continue feeding therapy -Famotidine twice daily , For now continue 27-calorie fortified formula, gave more samples of Fortini which is a 30-calorie formula and encouraged him to try it.  If he likes it and will drink it we will order it from insurance --Add 15 mL of prune or pear juice to soften stools -Had a long discussion with the family that children with Nasim-Silver are frequently slow feeders and weight gain is generally slow and they follow a different growth curve.  It is not surprising is difficult to feed but I would avoid forcing him to eat as that can cause feeding aversions. Family to call if any questions or concerns and follow-up in 3 to 4 weeks

## 2025-06-14 NOTE — CONSULT LETTER
[Dear  ___] : Dear  [unfilled], [Courtesy Letter:] : I had the pleasure of seeing your patient, [unfilled], in my office today. [Please see my note below.] : Please see my note below. [Consult Closing:] : Thank you very much for allowing me to participate in the care of this patient.  If you have any questions, please do not hesitate to contact me. [Sincerely,] : Sincerely, [FreeTextEntry3] : Coco Dudley MD Attending Physician Pediatric Gastroenterology and Nutrition

## 2025-06-14 NOTE — PHYSICAL EXAM
[NAD] : in no acute distress [Alert and Active] : alert and active [PERRL] : pupils were equal, round, reactive to light  [Moist & Pink Mucous Membranes] : moist and pink mucous membranes [CTAB] : lungs clear to auscultation bilaterally [Regular Rate and Rhythm] : regular rate and rhythm [Normal S1, S2] : normal S1 and S2 [Soft] : soft  [Normal Bowel Sounds] : normal bowel sounds [No HSM] : no hepatosplenomegaly appreciated [Normal Position] : normal position [Well-Perfused] : well-perfused [Appropriate Behavior] : appropriate behavior [icteric] : anicteric [Respiratory Distress] : no respiratory distress  [Wheeze] : no wheezing  [Murmur] : no murmur [Distended] : non distended [Tender] : non tender [Stool Palpable] : no stool palpable [Mass ___ cm] : no masses were palpated [Lymphadenopathy] : no lymphadenopathy  [Edema] : no edema [Cyanosis] : no cyanosis [Rash] : no rash [Jaundice] : no jaundice [FreeTextEntry1] : +small for age, otherwise well-appearing

## 2025-06-14 NOTE — HISTORY OF PRESENT ILLNESS
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  I reviewed the notes in the EMR from the pediatrician, NICU, genetics.  BHx: Born 33 weeks via CS for NRFHT, 2 vessel umbilicus. At 2mo transferred to NICU for CPAP, noted to have triangular face, genetics sent microarray confirming Nasim Silver syndrome (a/w growth deficiency particularly weight, hypoglycemia, developmental delays, micrognathia,  abnormalities). HUS w/ germinal matrix hemorrhage. MARUY w/ small R kidney. Normal NBS.  He is here today for follow-up visit. Eating has improved a little and he is now eating a little more.  He is taking 50ml/60ml per feed and every 2-3 hrs. Eating overnight. ? 16oz.  He has been doing well, no spitting up.  He is eating in 25 min now then won't eat more. .He is on similac neosure  27 and breastmilk , more the neosure now, not as much breastmilk.   He is stooling every 4 days.  Stool is soft. There is no blood or mucous in the stool. When he does not stool he has a lot of gas. Once he had 70ml.  He is gaining weight though weight gain has been very slow

## 2025-06-14 NOTE — REASON FOR VISIT
[Consultation Follow Up] : a consultation follow up  [Mother] : mother [Father] : father [Parents] : parents [Language Line ] : provided by Language Line   [Interpreters_IDNumber] : [id number] [Interpreters_FullName] : Marion [[id number]] : Liberian

## 2025-07-02 NOTE — PHYSICAL EXAM
[NAD] : in no acute distress [Alert and Active] : alert and active [PERRL] : pupils were equal, round, reactive to light  [Moist & Pink Mucous Membranes] : moist and pink mucous membranes [CTAB] : lungs clear to auscultation bilaterally [Regular Rate and Rhythm] : regular rate and rhythm [Normal S1, S2] : normal S1 and S2 [Soft] : soft  [Normal Bowel Sounds] : normal bowel sounds [No HSM] : no hepatosplenomegaly appreciated [Well-Perfused] : well-perfused [Appropriate Behavior] : appropriate behavior [icteric] : anicteric [Respiratory Distress] : no respiratory distress  [Wheeze] : no wheezing  [Murmur] : no murmur [Distended] : non distended [Tender] : non tender [Stool Palpable] : no stool palpable [Mass ___ cm] : no masses were palpated [Lymphadenopathy] : no lymphadenopathy  [Edema] : no edema [Cyanosis] : no cyanosis [Rash] : no rash [Jaundice] : no jaundice [FreeTextEntry1] : +small for age, otherwise well-appearing

## 2025-07-02 NOTE — HISTORY OF PRESENT ILLNESS
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  I reviewed the notes in the EMR from the pediatrician, NICU, genetics.  BHx: Born 33 weeks via CS for NRFHT, 2 vessel umbilicus. At 2mo transferred to NICU for CPAP, noted to have triangular face, genetics sent microarray confirming Nasim Silver syndrome (a/w growth deficiency particularly weight, hypoglycemia, developmental delays, micrognathia,  abnormalities). HUS w/ germinal matrix hemorrhage. MAURY w/ small R kidney. Normal NBS.  He is here today for follow-up visit. Eating has improved a little and he is now eating a little more.  He is taking 50ml/60ml per feed and every 2-3 hrs. Eating overnight. ? 16oz.  He has been doing well, no spitting up.  He is eating in 25 min now then won't eat more. .He is on similac neosure  27 and breastmilk , more the neosure now, not as much breastmilk.   He is stooling every 4 days.  Stool is soft. There is no blood or mucous in the stool. When he does not stool he has a lot of gas. Once he had 70ml.  He is gaining weight though weight gain has been very slow  He is eating much better.  He will take 70-80ml every 2-3 hrs. Mom wakes him up at night after 3-4 hrs and at night he eats less (40-50ml).  He did not like the fortini. He is drinking neosure. He is stooling every 4 days, hard and hard to pass. Does not like prunes or pears. He drinks prune juice, but less than 5ml.

## 2025-07-02 NOTE — REASON FOR VISIT
[Consultation Follow Up] : a consultation follow up  [Mother] : mother [Father] : father [Parents] : parents [Language Line ] : provided by Language Line   [Interpreters_IDNumber] : [id number] [Interpreters_FullName] : Keeley [[id number]] : Israeli

## 2025-07-16 NOTE — HISTORY OF PRESENT ILLNESS
[Well-balanced] : well-balanced [Formula ___ oz/feed] : [unfilled] oz of formula per feed [Hours between feeds ___] : Child is fed every [unfilled] hours [Vitamins ___] : Patient takes [unfilled] vitamins daily [Fruits] : fruits [Vegetables] : vegetables [Normal] : Normal [___ voids per day] : [unfilled] voids per day [Frequency of stools: ___] : Frequency of stools: [unfilled]  stools [every other day] : every other day. [Pasty] : pasty [In Bassinet/Crib] : sleeps in bassinet/crib [On back] : sleeps on back [Sleeps 12-16 hours per 24 hours (including naps)] : sleeps 12-16 hours per 24 hours (including naps) [Tummy time] : tummy time [No] : No cigarette smoke exposure [Water heater temperature set at <120 degrees F] : Water heater temperature set at <120 degrees F [Rear facing car seat in back seat] : Rear facing car seat in back seat [Carbon Monoxide Detectors] : Carbon monoxide detectors at home [Smoke Detectors] : Smoke detectors at home. [YES] : Yes [Egg] : no egg [Meat] : no meat [Fish] : no fish [Peanut] : no peanut [Dairy] : no dairy [Co-sleeping] : no co-sleeping [Loose bedding, pillow, toys, and/or bumpers in crib] : no loose bedding, pillow, toys, and/or bumpers in crib [Pacifier use] : not using pacifier [Screen time only for video chatting] : screen time not just for video chatting [Exposure to electronic nicotine delivery system] : No exposure to electronic nicotine delivery system

## 2025-07-16 NOTE — DISCUSSION/SUMMARY
[No Elimination Concerns] : elimination [No Feeding Concerns] : feeding [No Skin Concerns] : skin [Normal Sleep Pattern] : sleep [ Infant] :  infant [Delayed Fine Motor Skills] : delayed fine motor skills [Delayed Gross Motor Skills] : delayed gross motor skills [Chromosomal Anomalies ___] : [unfilled] [No Medications] : ~He/She~ is not on any medications [Parent/Guardian] : parent/guardian [] : The components of the vaccine(s) to be administered today are listed in the plan of care. The disease(s) for which the vaccine(s) are intended to prevent and the risks have been discussed with the caretaker.  The risks are also included in the appropriate vaccination information statements which have been provided to the patient's caregiver.  The caregiver has given consent to vaccinate. [FreeTextEntry1] : Recommend breastfeeding, 8-12 feedings per day. If formula is needed, 2-4 oz every 3-4 hrs. Introduce single-ingredient foods rich in iron, one at a time. Incorporate up to 4 oz of flourinated water daily in a sippy cup. When teeth erupt wipe daily with washcloth. When in car, patient should be in rear-facing car seat in back seat. Put baby to sleep on back, in own crib with no loose or soft bedding. Lower crib matress. Help baby to maintain sleep and feeding routines. May offer pacifier if needed. Continue tummy time when awake. Ensure home is safe since baby is now more mobile. Do not use infant walker. Read aloud to baby.

## 2025-07-16 NOTE — PHYSICAL EXAM
[Alert] : alert [Normocephalic] : normocephalic [Flat Open Anterior [place]] : flat open anterior fontanelle [Red Reflex] : red reflex bilateral [PERRL] : PERRL [Normally Placed Ears] : normally placed ears [Auricles Well Formed] : auricles well formed [Clear Tympanic membranes] : clear tympanic membranes [Light reflex present] : light reflex present [Bony landmarks visible] : bony landmarks visible [Nares Patent] : nares patent [Palate Intact] : palate intact [Uvula Midline] : uvula midline [Supple, full passive range of motion] : supple, full passive range of motion [Symmetric Chest Rise] : symmetric chest rise [Clear to Auscultation Bilaterally] : clear to auscultation bilaterally [Regular Rate and Rhythm] : regular rate and rhythm [S1, S2 present] : S1, S2 present [+2 Femoral Pulses] : (+) 2 femoral pulses [Soft] : soft [Bowel Sounds] : bowel sounds present [Central Urethral Opening] : central urethral opening [Patent] : patent [Normally Placed] : normally placed [No Abnormal Lymph Nodes Palpated] : no abnormal lymph nodes palpated [Symmetric Buttocks Creases] : symmetric buttocks creases [Plantar Grasp] : plantar grasp reflex present [Cranial Nerves Grossly Intact] : cranial nerves grossly intact [Acute Distress] : no acute distress [Discharge] : no discharge [Tooth Eruption] : no tooth eruption [Palpable Masses] : no palpable masses [Murmurs] : no murmurs [Tender] : nontender [Distended] : nondistended [Hepatomegaly] : no hepatomegaly [Splenomegaly] : no splenomegaly [Circumcised] : not circumcised [Testicles Descended] : testicle(s) undescended [Castellon-Ortolani] : negative Castellon-Ortolani [Allis Sign] : negative Allis sign [Spinal Dimple] : no spinal dimple [Tuft of Hair] : no tuft of hair [Rash or Lesions] : no rash/lesions [de-identified] : right undescended  testicle

## 2025-07-16 NOTE — DEVELOPMENTAL MILESTONES
[Yes: _______] : yes, [unfilled] [Begins to turn when name called] : begins to turn when name called [Babbles] : babbles [Reaches for object and transfers] : reaches for object and transfers [Passed] : passed [Pats or smiles at reflection] : does not pat or smile at reflection [Rolls over prone to supine] : does not roll over prone to supine [Sits briefly without support] : does not sit briefly without support [FreeTextEntry1] : starting to turn